# Patient Record
Sex: FEMALE | Race: WHITE | NOT HISPANIC OR LATINO | Employment: OTHER | URBAN - METROPOLITAN AREA
[De-identification: names, ages, dates, MRNs, and addresses within clinical notes are randomized per-mention and may not be internally consistent; named-entity substitution may affect disease eponyms.]

---

## 2018-02-06 ENCOUNTER — OFFICE VISIT CONVERTED (OUTPATIENT)
Dept: ORTHOPEDIC SURGERY | Facility: CLINIC | Age: 68
End: 2018-02-06
Attending: PHYSICIAN ASSISTANT

## 2018-02-14 ENCOUNTER — OFFICE VISIT CONVERTED (OUTPATIENT)
Dept: ORTHOPEDIC SURGERY | Facility: CLINIC | Age: 68
End: 2018-02-14
Attending: ORTHOPAEDIC SURGERY

## 2018-02-20 ENCOUNTER — OFFICE VISIT CONVERTED (OUTPATIENT)
Dept: ORTHOPEDIC SURGERY | Facility: CLINIC | Age: 68
End: 2018-02-20
Attending: ORTHOPAEDIC SURGERY

## 2018-03-08 ENCOUNTER — OFFICE VISIT CONVERTED (OUTPATIENT)
Dept: ORTHOPEDIC SURGERY | Facility: CLINIC | Age: 68
End: 2018-03-08
Attending: ORTHOPAEDIC SURGERY

## 2018-03-21 ENCOUNTER — OFFICE VISIT CONVERTED (OUTPATIENT)
Dept: ORTHOPEDIC SURGERY | Facility: CLINIC | Age: 68
End: 2018-03-21
Attending: PHYSICIAN ASSISTANT

## 2018-04-19 ENCOUNTER — OFFICE VISIT CONVERTED (OUTPATIENT)
Dept: ORTHOPEDIC SURGERY | Facility: CLINIC | Age: 68
End: 2018-04-19
Attending: PHYSICIAN ASSISTANT

## 2018-04-19 ENCOUNTER — CONVERSION ENCOUNTER (OUTPATIENT)
Dept: ORTHOPEDIC SURGERY | Facility: CLINIC | Age: 68
End: 2018-04-19

## 2018-05-31 ENCOUNTER — OFFICE VISIT CONVERTED (OUTPATIENT)
Dept: ORTHOPEDIC SURGERY | Facility: CLINIC | Age: 68
End: 2018-05-31
Attending: PHYSICIAN ASSISTANT

## 2019-09-13 ENCOUNTER — OFFICE VISIT CONVERTED (OUTPATIENT)
Dept: SURGERY | Facility: CLINIC | Age: 69
End: 2019-09-13
Attending: UROLOGY

## 2019-09-13 ENCOUNTER — HOSPITAL ENCOUNTER (OUTPATIENT)
Dept: SURGERY | Facility: CLINIC | Age: 69
Discharge: HOME | End: 2019-09-13

## 2019-09-15 LAB — BACTERIA UR CULT: NORMAL

## 2019-09-24 ENCOUNTER — HOSPITAL ENCOUNTER (OUTPATIENT)
Dept: ULTRASOUND IMAGING | Facility: HOSPITAL | Age: 69
Discharge: HOME | End: 2019-09-24

## 2019-09-27 ENCOUNTER — OFFICE VISIT CONVERTED (OUTPATIENT)
Dept: SURGERY | Facility: CLINIC | Age: 69
End: 2019-09-27
Attending: UROLOGY

## 2020-10-09 ENCOUNTER — HOSPITAL ENCOUNTER (OUTPATIENT)
Dept: MRI IMAGING | Facility: HOSPITAL | Age: 70
Discharge: HOME | End: 2020-10-09
Attending: NURSE PRACTITIONER

## 2020-10-23 ENCOUNTER — OFFICE VISIT CONVERTED (OUTPATIENT)
Dept: ORTHOPEDIC SURGERY | Facility: CLINIC | Age: 70
End: 2020-10-23
Attending: ORTHOPAEDIC SURGERY

## 2021-03-22 ENCOUNTER — OFFICE VISIT CONVERTED (OUTPATIENT)
Dept: ORTHOPEDIC SURGERY | Facility: CLINIC | Age: 71
End: 2021-03-22
Attending: ORTHOPAEDIC SURGERY

## 2021-05-10 NOTE — H&P
History and Physical      Patient Name: Carlene Mims   Patient ID: [id number]   Sex: Female   YOB: 1950    Primary Care Provider: Kaycee RAHMAN   Referring Provider: Kaycee RAHMAN    Visit Date: October 23, 2020    Provider: Marv Collado MD   Location: [facility]   Location Address: [address] [place] [address]  [id number]   Location Phone: [phone]          Chief Complaint  · Right Shoulder Pain      History Of Present Illness  Carlene Mims is a 70 year old /White female who presents for evaluation of her right shoulder. She began having pain in her shoulder about 5 or 6 months ago. She has a history of a mastectomy on this side. She was doing a lot of work about 5 to 6 months ago, cutting hedges and lifting a lot of stuff, and developed shoulder pain. She denies any specific trauma besides overuse, and since then, she has been having pain in her right shoulder.       Past Medical History  Allergic rhinitis; Anxiety; Cancer; Closed 3-part fracture of surgical neck of left humerus with routine healing, subsequent encounter; Depression; Fibromyalgia; Gastric reflux; Gastric Ulcer; High blood pressure; Humerus fracture         Past Surgical History  Cholecystectomy; Colonoscopy; endoscopy; Hip Surgery; Hysterectomy; Mastectomy, left breast; Mastectomy, right breast; Nissen Fundoplication; Cindy Cath Removal; Shoulder surgery; Tonsillectomy         Medication List  Allergy Syringe 1 mL 28 Miscellaneous Syringe; amlodipine 5 mg oral tablet; aspirin 81 mg Oral tablet, chewable; citalopram 20 mg oral tablet; cyclobenzaprine 5 mg oral tablet; Flonase 50 mcg/actuation Nasal Spray, Suspension; hydroxyzine HCl 50 mg oral tablet; lisinopril 40 mg oral tablet; Macrobid 100 mg oral capsule; pantoprazole 40 mg oral tablet,delayed release (DR/EC); Probiotic 15 billion cell oral capsule; Vitamin D3 1,000 unit oral capsule         Allergy List  Bactrim         Family  "Medical History  - No Family History of Colorectal Cancer         Social History  Alcohol (Never); Tobacco (Former)         Review of Systems  · Constitutional  o Denies  o : fever, chills, weight loss  · Cardiovascular  o Denies  o : chest pain, shortness of breath  · Gastrointestinal  o Denies  o : liver disease, heartburn, nausea, blood in stools  · Genitourinary  o Denies  o : painful urination, blood in urine  · Integument  o Denies  o : rash, itching  · Neurologic  o Denies  o : headache, weakness, loss of consciousness  · Musculoskeletal  o Admits  o : painful, swollen joints  · Psychiatric  o Denies  o : drug/alcohol addiction, anxiety, depression      Vitals  Date Time BP Position Site L\R Cuff Size HR RR TEMP (F) WT  HT  BMI kg/m2 BSA m2 O2 Sat FR L/min FiO2 HC       10/23/2020 02:47 PM      88 - R   166lbs 2oz 5'  4\" 28.51 1.84 96 %            Physical Examination  · Constitutional  o Appearance  o : well developed, well-nourished, no obvious deformities present  · Head and Face  o Head  o :   § Inspection  § : normocephalic  o Face  o :   § Inspection  § : no facial lesions  · Eyes  o Conjunctivae  o : conjunctivae normal  o Sclerae  o : sclerae white  · Ears, Nose, Mouth and Throat  o Ears  o :   § External Ears  § : appearance within normal limits  § Hearing  § : intact  o Nose  o :   § External Nose  § : appearance normal  · Neck  o Inspection/Palpation  o : normal appearance  o Range of Motion  o : full range of motion  · Respiratory  o Respiratory Effort  o : breathing unlabored  o Inspection of Chest  o : normal appearance  o Auscultation of Lungs  o : no audible wheezing or rales  · Cardiovascular  o Heart  o : regular rate  · Gastrointestinal  o Abdominal Examination  o : soft and non-tender  · Skin and Subcutaneous Tissue  o General Inspection  o : intact, no rashes  · Psychiatric  o General  o : Alert and oriented x3  o Judgement and Insight  o : judgment and insight intact  o Mood and " Affect  o : mood normal, affect appropriate  · Right Shoulder  o Inspection  o : Appearance of the right shoulder is normal compared to the left. No atrophy or skin discoloration. Full range of motion. Tenderness over the anterior distal supraspinatus rotator cuff insertion. Pain with impingement testing. Pain with Mendiola' testing. Good strength of deltoid, biceps, triceps, wrist extensors, and wrist flexors. Sensation is grossly intact. Neurovascularly intact.   · Injection Note/Aspiration Note  o Site  o : right shoulder  o Procedure  o : After educating the patient, patient gave consent for procedure. After using Chloraprep, the joint space was injected. The patient tolerated the procedure well.  o Medication  o : 80 mg of DepoMedrol with 9cc of 1% Lidocaine  · Imaging  o Imaging  o : MRI of right shoulder performed at [facility] on 10/09/2020 revealed: 1) Tendinosis with partial articular-sided tearing of the supraspinatus and infraspinatus tendons; 2) Moderate AC joint and glenohumeral osteoarthritis; 3) Biceps tendinosis and tenosynovitis; 4) Mild panlabral tearing with no evidence of focal displaced tear; 5) Trace glenohumeral joint effusion.               Assessment  · Right shoulder pain, unspecified chronicity     719.41/M25.511  · Impingement syndrome of shoulder, right     726.2/M75.40      Plan  · Orders  o Depo-Medrol injection 80mg () - - 10/23/2020   Lot [id number] Exp 09 2021 [employer] Administered by EVANS Collado MD  o Shoulder Intra-articular Injection without US Guidance [facility] ([id number]) - - 10/23/2020   Lot [id number] Exp 08 01 2021 [employer] Administered by EVANS Collado MD  · Medications  o Medications have been Reconciled  o Transition of Care or Provider Policy  · Instructions  o Reviewed the patient's Past Medical, Social, and Family history as well as the ROS at today's visit, no changes.  o Call or return if worsening symptoms.  o We discussed right shoulder impingement and injection.    o Follow up in 4-6 weeks.   o This note was transcribed by Karrie cheatham.            Electronically Signed by: Karrie Rosario-, Other -Author on October 26, 2020 08:09:31 PM  Electronically Co-signed by: Marv Collado MD -Reviewer on October 26, 2020 08:55:31 PM

## 2021-05-14 VITALS — HEART RATE: 88 BPM | OXYGEN SATURATION: 96 % | WEIGHT: 166.12 LBS | BODY MASS INDEX: 28.36 KG/M2 | HEIGHT: 64 IN

## 2021-05-14 VITALS — WEIGHT: 165.25 LBS | BODY MASS INDEX: 28.21 KG/M2 | OXYGEN SATURATION: 96 % | HEART RATE: 78 BPM | HEIGHT: 64 IN

## 2021-05-14 NOTE — PROGRESS NOTES
Progress Note      Patient Name: Carlene Mims   Patient ID: [id number]   Sex: Female   YOB: 1950    Primary Care Provider: Kaycee RAHMAN   Referring Provider: Kaycee RAHMAN    Visit Date: March 22, 2021    Provider: Marv Collado MD   Location: [facility]   Location Address: [address] [place] [address]  [id number]   Location Phone: [phone]          Chief Complaint  · Right shoulder pain       History Of Present Illness  Carlene Mims is a 70 year old /White female who presents today to [facility].      Patient presents today for a follow-up of right shoulder pain. Patient has a history of right shoulder impingement that she has been treating conservatively. Patient has been having right shoulder pain due to overuse when cutting hedges and doing a lot of lifting. She states her range of motion has improved and sometimes she has pains that make it feel like her right shoulder is popping. She presents today requesting a right shoulder injection.       Past Medical History  Allergic rhinitis; Anxiety; Cancer; Closed 3-part fracture of surgical neck of left humerus with routine healing, subsequent encounter; Depression; Fibromyalgia; Gastric reflux; Gastric Ulcer; High blood pressure; Humerus fracture         Past Surgical History  Cholecystectomy; Colonoscopy; endoscopy; Hip Surgery; Hysterectomy; Mastectomy, left breast; Mastectomy, right breast; Nissen Fundoplication; Cindy Cath Removal; Shoulder surgery; Tonsillectomy         Medication List  Allergy Syringe 1 mL 28 Miscellaneous Syringe; amlodipine 5 mg oral tablet; aspirin 81 mg Oral tablet, chewable; citalopram 20 mg oral tablet; cyclobenzaprine 5 mg oral tablet; Flonase 50 mcg/actuation Nasal Spray, Suspension; hydroxyzine HCl 50 mg oral tablet; lisinopril 40 mg oral tablet; Macrobid 100 mg oral capsule; pantoprazole 40 mg oral tablet,delayed release (DR/EC); Probiotic 15 billion cell  "oral capsule; Vitamin D3 1,000 unit oral capsule         Allergy List  Bactrim       Allergies Reconciled  Family Medical History  - No Family History of Colorectal Cancer         Social History  Alcohol (Never); Tobacco (Former)         Review of Systems  · Constitutional  o Denies  o : fever, chills, weight loss  · Cardiovascular  o Denies  o : chest pain, shortness of breath  · Gastrointestinal  o Denies  o : liver disease, heartburn, nausea, blood in stools  · Genitourinary  o Denies  o : painful urination, blood in urine  · Integument  o Denies  o : rash, itching  · Neurologic  o Denies  o : headache, weakness, loss of consciousness  · Musculoskeletal  o Denies  o : painful, swollen joints  · Psychiatric  o Denies  o : drug/alcohol addiction, anxiety, depression      Vitals  Date Time BP Position Site L\R Cuff Size HR RR TEMP (F) WT  HT  BMI kg/m2 BSA m2 O2 Sat FR L/min FiO2        03/22/2021 01:45 PM      78 - R   165lbs 4oz 5'  4\" 28.36 1.84 96 %            Physical Examination  · Constitutional  o Appearance  o : well developed, well-nourished, no obvious deformities present  · Head and Face  o Head  o :   § Inspection  § : normocephalic  o Face  o :   § Inspection  § : no facial lesions  · Eyes  o Conjunctivae  o : conjunctivae normal  o Sclerae  o : sclerae white  · Ears, Nose, Mouth and Throat  o Ears  o :   § External Ears  § : appearance within normal limits  § Hearing  § : intact  o Nose  o :   § External Nose  § : appearance normal  · Neck  o Inspection/Palpation  o : normal appearance  o Range of Motion  o : full range of motion  · Respiratory  o Respiratory Effort  o : breathing unlabored  o Inspection of Chest  o : normal appearance  o Auscultation of Lungs  o : no audible wheezing or rales  · Cardiovascular  o Heart  o : regular rate  · Gastrointestinal  o Abdominal Examination  o : soft and non-tender  · Skin and Subcutaneous Tissue  o General Inspection  o : intact, no " rashes  · Psychiatric  o General  o : Alert and oriented x3  o Judgement and Insight  o : judgment and insight intact  o Mood and Affect  o : mood normal, affect appropriate  · Right Shoulder  o Inspection  o : Sensation grossly intact. Neurovascular intact. Skin intact. Pulses are pleasant. Full forward flexion. Full cross body adduction. IR to L5. No swelling, skin discoloration or atrophy. Tender to palpation of the shoulder. Good tone of deltoid, biceps, triceps, wrist extensors, and wrist flexors. Full ER. Full abduction. Pain with Mendiola. Pain with Neer's. Good RTC strength.   · Injection Note/Aspiration Note  o Site  o : right shoulder  o Procedure  o : Procedure: After educating the patient, patient gave consent for procedure. After using Chloraprep, the joint space was injected. The patient tolerated the procedure well.   o Medication  o : 80 mg of DepoMedrol with 9cc of 1% Xylocaine          Assessment  · Right shoulder pain, unspecified chronicity     719.41/M25.511  · Shoulder impingement syndrome, right     726.2/M75.41      Plan  · Orders  o Depo-Medrol injection 80mg () - - 03/23/2021   Lot [id number] Exp 01 2022 [employer] Administered by EVANS Collado MD  o Shoulder Intra-articular Injection without US Guidance [facility] ([id number]) - - 03/23/2021   Lot [id number] Exp 04 2024 [employer] Administered by EVANS Collado MD  · Medications  o Medications have been Reconciled  o Transition of Care or Provider Policy  · Instructions  o Dr. Collado saw and examined the patient and agrees with plan.   o Reviewed the patient's Past Medical, Social, and Family history as well as the ROS at today's visit, no changes.  o Call or return if worsening symptoms.  o Follow Up PRN.  o The above service was scribed by Shey Ardon on my behalf and I attest to the accuracy of the note. linden  o Discussed treatment plans with the patient. Discussed operative vs non-operative measures. She wishes to continue conservative management at  this time. Patient received a right shoulder injection and tolerated this procedure well.            Electronically Signed by: Shey Ardon-, Other -Author on March 25, 2021 07:57:48 AM  Electronically Co-signed by: Marv Collado MD -Reviewer on March 28, 2021 01:44:54 PM

## 2021-05-15 VITALS — BODY MASS INDEX: 26.8 KG/M2 | HEIGHT: 64 IN | WEIGHT: 157 LBS | RESPIRATION RATE: 12 BRPM

## 2021-05-15 VITALS — BODY MASS INDEX: 26.82 KG/M2 | HEIGHT: 64 IN | RESPIRATION RATE: 12 BRPM | WEIGHT: 157.12 LBS

## 2021-05-16 VITALS — HEIGHT: 64 IN | WEIGHT: 159 LBS | BODY MASS INDEX: 27.14 KG/M2 | RESPIRATION RATE: 16 BRPM

## 2021-05-16 VITALS — HEIGHT: 64 IN | RESPIRATION RATE: 16 BRPM | BODY MASS INDEX: 27.14 KG/M2 | WEIGHT: 159 LBS

## 2021-05-16 VITALS
RESPIRATION RATE: 16 BRPM | RESPIRATION RATE: 16 BRPM | BODY MASS INDEX: 26.98 KG/M2 | BODY MASS INDEX: 27.14 KG/M2 | HEIGHT: 64 IN | WEIGHT: 159 LBS | WEIGHT: 158 LBS | HEIGHT: 64 IN

## 2021-05-16 VITALS — HEIGHT: 64 IN | RESPIRATION RATE: 18 BRPM | BODY MASS INDEX: 27.14 KG/M2 | WEIGHT: 159 LBS

## 2021-11-04 ENCOUNTER — OFFICE VISIT (OUTPATIENT)
Dept: GASTROENTEROLOGY | Facility: CLINIC | Age: 71
End: 2021-11-04

## 2021-11-04 ENCOUNTER — LAB (OUTPATIENT)
Dept: LAB | Facility: HOSPITAL | Age: 71
End: 2021-11-04

## 2021-11-04 VITALS
DIASTOLIC BLOOD PRESSURE: 59 MMHG | SYSTOLIC BLOOD PRESSURE: 145 MMHG | BODY MASS INDEX: 27.17 KG/M2 | WEIGHT: 159.17 LBS | HEIGHT: 64 IN | HEART RATE: 86 BPM

## 2021-11-04 DIAGNOSIS — R10.10 UPPER ABDOMINAL PAIN: ICD-10-CM

## 2021-11-04 DIAGNOSIS — R14.0 ABDOMINAL BLOATING: Primary | ICD-10-CM

## 2021-11-04 DIAGNOSIS — R63.0 DECREASED APPETITE: ICD-10-CM

## 2021-11-04 DIAGNOSIS — R14.0 ABDOMINAL BLOATING: ICD-10-CM

## 2021-11-04 DIAGNOSIS — R63.4 WEIGHT LOSS: ICD-10-CM

## 2021-11-04 DIAGNOSIS — Z87.19 HISTORY OF DIVERTICULITIS: ICD-10-CM

## 2021-11-04 LAB
ALBUMIN SERPL-MCNC: 4.7 G/DL (ref 3.5–5.2)
ALBUMIN/GLOB SERPL: 1.6 G/DL
ALP SERPL-CCNC: 156 U/L (ref 39–117)
ALT SERPL W P-5'-P-CCNC: 16 U/L (ref 1–33)
ANION GAP SERPL CALCULATED.3IONS-SCNC: 8.7 MMOL/L (ref 5–15)
AST SERPL-CCNC: 21 U/L (ref 1–32)
BILIRUB SERPL-MCNC: 0.2 MG/DL (ref 0–1.2)
BUN SERPL-MCNC: 21 MG/DL (ref 8–23)
BUN/CREAT SERPL: 15.6 (ref 7–25)
CALCIUM SPEC-SCNC: 10.5 MG/DL (ref 8.6–10.5)
CHLORIDE SERPL-SCNC: 103 MMOL/L (ref 98–107)
CO2 SERPL-SCNC: 26.3 MMOL/L (ref 22–29)
CREAT SERPL-MCNC: 1.35 MG/DL (ref 0.57–1)
GFR SERPL CREATININE-BSD FRML MDRD: 39 ML/MIN/1.73
GLOBULIN UR ELPH-MCNC: 2.9 GM/DL
GLUCOSE SERPL-MCNC: 94 MG/DL (ref 65–99)
LIPASE SERPL-CCNC: 31 U/L (ref 13–60)
POTASSIUM SERPL-SCNC: 4.4 MMOL/L (ref 3.5–5.2)
PROT SERPL-MCNC: 7.6 G/DL (ref 6–8.5)
SODIUM SERPL-SCNC: 138 MMOL/L (ref 136–145)

## 2021-11-04 PROCEDURE — 36415 COLL VENOUS BLD VENIPUNCTURE: CPT

## 2021-11-04 PROCEDURE — 99204 OFFICE O/P NEW MOD 45 MIN: CPT | Performed by: NURSE PRACTITIONER

## 2021-11-04 PROCEDURE — 80053 COMPREHEN METABOLIC PANEL: CPT

## 2021-11-04 PROCEDURE — 83690 ASSAY OF LIPASE: CPT | Performed by: NURSE PRACTITIONER

## 2021-11-04 PROCEDURE — 86677 HELICOBACTER PYLORI ANTIBODY: CPT

## 2021-11-04 RX ORDER — PSEUDOEPHEDRINE HCL 30 MG/1
TABLET, FILM COATED ORAL
COMMUNITY
Start: 2021-10-25

## 2021-11-04 RX ORDER — FLUTICASONE PROPIONATE 50 MCG
SPRAY, SUSPENSION (ML) NASAL
COMMUNITY

## 2021-11-04 RX ORDER — TRAZODONE HYDROCHLORIDE 50 MG/1
50 TABLET ORAL
COMMUNITY
Start: 2021-10-25

## 2021-11-04 RX ORDER — ASPIRIN 81 MG/1
TABLET, CHEWABLE ORAL
COMMUNITY
End: 2022-03-09

## 2021-11-04 RX ORDER — LISINOPRIL 40 MG/1
TABLET ORAL
COMMUNITY

## 2021-11-04 RX ORDER — ESOMEPRAZOLE MAGNESIUM 40 MG/1
CAPSULE, DELAYED RELEASE ORAL
COMMUNITY

## 2021-11-04 RX ORDER — SOD SULF/POT CHLORIDE/MAG SULF 1.479 G
12 TABLET ORAL TAKE AS DIRECTED
Qty: 24 TABLET | Refills: 0 | Status: SHIPPED | OUTPATIENT
Start: 2021-11-04 | End: 2021-12-22

## 2021-11-04 RX ORDER — CITALOPRAM 20 MG/1
TABLET ORAL
COMMUNITY

## 2021-11-04 RX ORDER — CYCLOBENZAPRINE HCL 5 MG
5 TABLET ORAL 2 TIMES DAILY PRN
COMMUNITY
Start: 2021-07-30

## 2021-11-04 RX ORDER — AMLODIPINE BESYLATE 5 MG/1
TABLET ORAL
COMMUNITY

## 2021-11-04 RX ORDER — HYDROXYZINE HYDROCHLORIDE 25 MG/1
25 TABLET, FILM COATED ORAL 3 TIMES DAILY PRN
COMMUNITY
Start: 2021-09-28 | End: 2022-03-09

## 2021-11-04 RX ORDER — SUCRALFATE 1 G/1
TABLET ORAL
COMMUNITY
End: 2022-03-09

## 2021-11-04 RX ORDER — FAMOTIDINE 40 MG/1
40 TABLET, FILM COATED ORAL 2 TIMES DAILY
COMMUNITY
Start: 2021-09-21

## 2021-11-04 RX ORDER — PROMETHAZINE HYDROCHLORIDE 12.5 MG/1
12.5 TABLET ORAL EVERY 6 HOURS PRN
Qty: 15 TABLET | Refills: 0 | Status: SHIPPED | OUTPATIENT
Start: 2021-11-04 | End: 2021-12-22

## 2021-11-04 NOTE — PROGRESS NOTES
"Patient Name: Carlene Mims   Visit Date: 11/04/2021   Patient ID: [id number]  Provider: JOSIAH Lance    Sex: female  Location:  Location Address:  Location Phone: [address] [address] [phone]    YOB: 1950      Primary Care Provider Kaycee Evans APRN      Referring Provider: No ref. provider found        Chief Complaint  Abdominal Pain (Upper abdomen pain, Burning sensation and sometimes feels like spasms) and Nausea    History of Present Illness  Carlene Mims is a 71 y.o. who presents to [facility] on referral from No ref. provider found for a gastroenterology evaluation of Abdominal Pain (Upper abdomen pain, Burning sensation and sometimes feels like spasms) and Nausea.    Ms. Mims reports upper abdominal burning and \"spasaming\" for the last few months. PCP placed her on protonix and carafate with no relief of symtoms. She is now on nexium, pepcid, and carafate and symptoms resolved for several days however is now back \"and worse than ever\". Abdominal bloating present. Reflux when laying down at night. Several nights she has awoken due to the reflux and had to go lay on the couch for elevation of head.Nausea often without vomiting.  Appetite has significantly decreased, 10 pound weight loss in the last few months.    H/o Nissen and hiatal hernia repair 2015    Reports she also was diagnosed with diverticulitis approximately 5 months ago [facility], requesting records.  Unable to recall when her last colonoscopy was \"long time ago\". Bowel movement normally several times a week, however has been a week before without BM. Using miralax PRN which does help facilitate a BM.  Denies any hematochezia, melena, or family hx of colon cancer.     Labs Result Review Imaging    Past Medical History:   Diagnosis Date   • Hypertension        Past Surgical History:   Procedure Laterality Date   • CHOLECYSTECTOMY  " 1999   • HERNIA REPAIR  2015   • HYSTERECTOMY  1975   • MASTECTOMY  1995, 2011         Current Outpatient Medications:   •  amLODIPine (NORVASC) 5 MG tablet, amlodipine 5 mg oral tablet take 1 tablet (5 mg) by oral route once daily   Active, Disp: , Rfl:   •  aspirin 81 MG chewable tablet, aspirin 81 mg oral tablet,chewable chew 1 tablet (81 mg) by oral route once daily   Active, Disp: , Rfl:   •  Cholecalciferol 25 MCG (1000 UT) capsule, Vitamin D3 1,000 unit oral capsule take 1 capsule by oral route daily   Active, Disp: , Rfl:   •  citalopram (CeleXA) 20 MG tablet, citalopram 20 mg oral tablet take 1 tablet (20 mg) by oral route once daily   Active, Disp: , Rfl:   •  cyclobenzaprine (FLEXERIL) 5 MG tablet, Take 5 mg by mouth 2 (Two) Times a Day., Disp: , Rfl:   •  esomeprazole (nexIUM) 40 MG capsule, Nexium 40 mg oral capsule,delayed release(DR/EC) take 1 capsule (40 mg) by oral route once daily   Suspended, Disp: , Rfl:   •  famotidine (PEPCID) 40 MG tablet, Take 40 mg by mouth 2 (Two) Times a Day., Disp: , Rfl:   •  fluticasone (Flonase) 50 MCG/ACT nasal spray, Flonase 50 mcg/actuation nasal spray,suspension inhale 1 spray (50 mcg) in each nostril by intranasal route once daily   Active, Disp: , Rfl:   •  hydrOXYzine (ATARAX) 25 MG tablet, Take 25 mg by mouth 3 (Three) Times a Day As Needed., Disp: , Rfl:   •  lisinopril (PRINIVIL,ZESTRIL) 40 MG tablet, lisinopril 40 mg oral tablet take 1 tablet (40 mg) by oral route once daily   Active, Disp: , Rfl:   •  sucralfate (Carafate) 1 g tablet, Carafate 1 gram oral tablet take 1 tablet (1 gram) by oral route 4 times per day on an empty stomach 1 hour before meals and at bedtime   Suspended, Disp: , Rfl:   •  SudoGest 30 MG tablet, TAKE ONE TABLET BY MOUTH EVERY 4 TO 6 HOURS FOR sinus pressure (monitor blood pressure), Disp: , Rfl:   •  traZODone (DESYREL) 50 MG tablet, Take 50 mg by mouth every night at bedtime., Disp: , Rfl:   •  promethazine (PHENERGAN) 12.5 MG  "tablet, Take 1 tablet by mouth Every 6 (Six) Hours As Needed for Nausea or Vomiting., Disp: 15 tablet, Rfl: 0  •  Sodium Sulfate-Mag Sulfate-KCl (Sutab) 8255-294-216 MG tablet, Take 12 tablets by mouth Take As Directed. Take 12 tablets at 6 pm and 12 tablets 4 hours prior to procedure., Disp: 24 tablet, Rfl: 0     Allergies   Allergen Reactions   • Sulfamethoxazole-Trimethoprim Unknown - Low Severity       History reviewed. No pertinent family history.     Social History     Social History Narrative   • Not on file         Objective     Review of Systems   Constitutional: Positive for appetite change and unexpected weight loss.   Gastrointestinal: Positive for abdominal pain and nausea.        Vital Signs:   /59 (BP Location: Right leg, Patient Position: Sitting, Cuff Size: Large Adult)   Pulse 86   Ht 162.6 cm (64\")   Wt 72.2 kg (159 lb 2.8 oz)   BMI 27.32 kg/m²       Physical Exam  Abdominal:      Tenderness: There is abdominal tenderness in the right upper quadrant and epigastric area.         Result Review :   The following data was reviewed by: JOSIAH Lance on 11/04/2021:      No results found for: IRON, TIBC, FERRITIN, LABIRON           Assessment and Plan    Diagnoses and all orders for this visit:    1. Abdominal bloating (Primary)  -     Helicobacter Pylori, IgA IgG IgM; Future  -     Comprehensive Metabolic Panel; Future  -     Lipase  -     Case Request; Standing  -     COVID PRE-OP / PRE-PROCEDURE SCREENING ORDER (NO ISOLATION) - Swab, Nasopharynx; Future  -     Case Request    2. Upper abdominal pain  -     Helicobacter Pylori, IgA IgG IgM; Future  -     Comprehensive Metabolic Panel; Future  -     Lipase  -     Case Request; Standing  -     COVID PRE-OP / PRE-PROCEDURE SCREENING ORDER (NO ISOLATION) - Swab, Nasopharynx; Future  -     Case Request    3. History of diverticulitis  -     Case Request; Standing  -     COVID PRE-OP / PRE-PROCEDURE SCREENING ORDER (NO ISOLATION) - Swab, " Nasopharynx; Future  -     Case Request    4. Decreased appetite    5. Weight loss    Other orders  -     Follow Anesthesia Guidelines / Protocol; Future  -     Obtain Informed Consent; Future  -     Sodium Sulfate-Mag Sulfate-KCl (Sutab) 0339-062-326 MG tablet; Take 12 tablets by mouth Take As Directed. Take 12 tablets at 6 pm and 12 tablets 4 hours prior to procedure.  Dispense: 24 tablet; Refill: 0  -     promethazine (PHENERGAN) 12.5 MG tablet; Take 1 tablet by mouth Every 6 (Six) Hours As Needed for Nausea or Vomiting.  Dispense: 15 tablet; Refill: 0      ESOPHAGOGASTRODUODENOSCOPY (N/A), COLONOSCOPY (N/A)       Follow Up   Return for Follow up after procedure.  Patient was given instructions and counseling regarding her condition or for health maintenance advice. Please see specific information pulled into the AVS if appropriate.

## 2021-11-06 LAB
H PYLORI IGA SER-ACNC: <9 UNITS (ref 0–8.9)
H PYLORI IGG SER IA-ACNC: 0.84 INDEX VALUE (ref 0–0.79)
H PYLORI IGM SER-ACNC: <9 UNITS (ref 0–8.9)

## 2021-11-08 DIAGNOSIS — R74.8 ELEVATED ALKALINE PHOSPHATASE LEVEL: Primary | ICD-10-CM

## 2021-11-09 ENCOUNTER — TELEPHONE (OUTPATIENT)
Dept: GASTROENTEROLOGY | Facility: CLINIC | Age: 71
End: 2021-11-09

## 2021-11-09 NOTE — TELEPHONE ENCOUNTER
----- Message from JOSIAH Lance sent at 11/8/2021  4:13 PM EST -----  Elevated alkaline phosphatase.  Ordering abdominal ultrasound to further evaluate.

## 2021-11-11 NOTE — TELEPHONE ENCOUNTER
Patient called back about her results. Patient was given results and consented to doing the US. US was scheduled for patient. Patient verbalized understanding. Patient was notified that she had to be NPO 8hrs prior to the test.

## 2021-11-17 ENCOUNTER — HOSPITAL ENCOUNTER (OUTPATIENT)
Dept: ULTRASOUND IMAGING | Facility: HOSPITAL | Age: 71
Discharge: HOME | End: 2021-11-17
Admitting: NURSE PRACTITIONER

## 2021-11-17 DIAGNOSIS — R74.8 ELEVATED ALKALINE PHOSPHATASE LEVEL: ICD-10-CM

## 2021-11-17 PROCEDURE — 76700 US EXAM ABDOM COMPLETE: CPT

## 2021-11-18 DIAGNOSIS — J90 PLEURAL EFFUSION: Primary | ICD-10-CM

## 2021-11-19 ENCOUNTER — TELEPHONE (OUTPATIENT)
Dept: GASTROENTEROLOGY | Facility: CLINIC | Age: 71
End: 2021-11-19

## 2021-11-19 NOTE — TELEPHONE ENCOUNTER
Patient was given results and lab order for x ray was sent to [facility] per patient request.

## 2021-11-19 NOTE — TELEPHONE ENCOUNTER
----- Message from JOSIAH Lance sent at 11/18/2021  1:43 PM EST -----  Suspected small left pleural effusion noted on CT.  Ordering chest x-ray to further evaluate.

## 2021-11-21 ENCOUNTER — HOSPITAL ENCOUNTER (EMERGENCY)
Facility: HOSPITAL | Age: 71
Discharge: HOME | End: 2021-11-21
Attending: EMERGENCY MEDICINE | Admitting: EMERGENCY MEDICINE

## 2021-11-21 ENCOUNTER — APPOINTMENT (OUTPATIENT)
Dept: CT IMAGING | Facility: HOSPITAL | Age: 71
End: 2021-11-21

## 2021-11-21 VITALS
TEMPERATURE: 98.2 F | BODY MASS INDEX: 26.12 KG/M2 | WEIGHT: 153 LBS | RESPIRATION RATE: 18 BRPM | SYSTOLIC BLOOD PRESSURE: 146 MMHG | HEART RATE: 79 BPM | DIASTOLIC BLOOD PRESSURE: 74 MMHG | OXYGEN SATURATION: 96 % | HEIGHT: 64 IN

## 2021-11-21 DIAGNOSIS — R10.13 ABDOMINAL PAIN, ACUTE, EPIGASTRIC: Primary | ICD-10-CM

## 2021-11-21 LAB
ALBUMIN SERPL-MCNC: 4.5 G/DL (ref 3.5–5.2)
ALBUMIN/GLOB SERPL: 1.6 G/DL
ALP SERPL-CCNC: 174 U/L (ref 39–117)
ALT SERPL W P-5'-P-CCNC: 10 U/L (ref 1–33)
ANION GAP SERPL CALCULATED.3IONS-SCNC: 11.3 MMOL/L (ref 5–15)
AST SERPL-CCNC: 15 U/L (ref 1–32)
BACTERIA UR QL AUTO: ABNORMAL /HPF
BASOPHILS # BLD AUTO: 0.06 10*3/MM3 (ref 0–0.2)
BASOPHILS NFR BLD AUTO: 0.8 % (ref 0–1.5)
BILIRUB SERPL-MCNC: 0.3 MG/DL (ref 0–1.2)
BILIRUB UR QL STRIP: NEGATIVE
BUN SERPL-MCNC: 15 MG/DL (ref 8–23)
BUN/CREAT SERPL: 13.4 (ref 7–25)
CALCIUM SPEC-SCNC: 10.2 MG/DL (ref 8.6–10.5)
CHLORIDE SERPL-SCNC: 103 MMOL/L (ref 98–107)
CLARITY UR: CLEAR
CO2 SERPL-SCNC: 25.7 MMOL/L (ref 22–29)
COLOR UR: YELLOW
CREAT SERPL-MCNC: 1.12 MG/DL (ref 0.57–1)
DEPRECATED RDW RBC AUTO: 43.4 FL (ref 37–54)
EOSINOPHIL # BLD AUTO: 0.24 10*3/MM3 (ref 0–0.4)
EOSINOPHIL NFR BLD AUTO: 3.1 % (ref 0.3–6.2)
ERYTHROCYTE [DISTWIDTH] IN BLOOD BY AUTOMATED COUNT: 13.1 % (ref 12.3–15.4)
GFR SERPL CREATININE-BSD FRML MDRD: 48 ML/MIN/1.73
GLOBULIN UR ELPH-MCNC: 2.9 GM/DL
GLUCOSE SERPL-MCNC: 101 MG/DL (ref 65–99)
GLUCOSE UR STRIP-MCNC: NEGATIVE MG/DL
HCT VFR BLD AUTO: 38.3 % (ref 34–46.6)
HGB BLD-MCNC: 12.5 G/DL (ref 12–15.9)
HGB UR QL STRIP.AUTO: NEGATIVE
HOLD SPECIMEN: NORMAL
HOLD SPECIMEN: NORMAL
HYALINE CASTS UR QL AUTO: ABNORMAL /LPF
IMM GRANULOCYTES # BLD AUTO: 0.01 10*3/MM3 (ref 0–0.05)
IMM GRANULOCYTES NFR BLD AUTO: 0.1 % (ref 0–0.5)
KETONES UR QL STRIP: NEGATIVE
LEUKOCYTE ESTERASE UR QL STRIP.AUTO: ABNORMAL
LIPASE SERPL-CCNC: 23 U/L (ref 13–60)
LYMPHOCYTES # BLD AUTO: 1.74 10*3/MM3 (ref 0.7–3.1)
LYMPHOCYTES NFR BLD AUTO: 22.4 % (ref 19.6–45.3)
MCH RBC QN AUTO: 29.8 PG (ref 26.6–33)
MCHC RBC AUTO-ENTMCNC: 32.6 G/DL (ref 31.5–35.7)
MCV RBC AUTO: 91.2 FL (ref 79–97)
MONOCYTES # BLD AUTO: 0.6 10*3/MM3 (ref 0.1–0.9)
MONOCYTES NFR BLD AUTO: 7.7 % (ref 5–12)
NEUTROPHILS NFR BLD AUTO: 5.12 10*3/MM3 (ref 1.7–7)
NEUTROPHILS NFR BLD AUTO: 65.9 % (ref 42.7–76)
NITRITE UR QL STRIP: NEGATIVE
NRBC BLD AUTO-RTO: 0 /100 WBC (ref 0–0.2)
PH UR STRIP.AUTO: 7.5 [PH] (ref 5–8)
PLATELET # BLD AUTO: 414 10*3/MM3 (ref 140–450)
PMV BLD AUTO: 10.2 FL (ref 6–12)
POTASSIUM SERPL-SCNC: 4.4 MMOL/L (ref 3.5–5.2)
PROT SERPL-MCNC: 7.4 G/DL (ref 6–8.5)
PROT UR QL STRIP: NEGATIVE
QT INTERVAL: 356 MS
RBC # BLD AUTO: 4.2 10*6/MM3 (ref 3.77–5.28)
RBC # UR STRIP: ABNORMAL /HPF
REF LAB TEST METHOD: ABNORMAL
SODIUM SERPL-SCNC: 140 MMOL/L (ref 136–145)
SP GR UR STRIP: 1.01 (ref 1–1.03)
SQUAMOUS #/AREA URNS HPF: ABNORMAL /HPF
TROPONIN T SERPL-MCNC: <0.01 NG/ML (ref 0–0.03)
UROBILINOGEN UR QL STRIP: ABNORMAL
WBC # UR STRIP: ABNORMAL /HPF
WBC NRBC COR # BLD: 7.77 10*3/MM3 (ref 3.4–10.8)
WHOLE BLOOD HOLD SPECIMEN: NORMAL
WHOLE BLOOD HOLD SPECIMEN: NORMAL

## 2021-11-21 PROCEDURE — 99283 EMERGENCY DEPT VISIT LOW MDM: CPT

## 2021-11-21 PROCEDURE — 83690 ASSAY OF LIPASE: CPT | Performed by: EMERGENCY MEDICINE

## 2021-11-21 PROCEDURE — 93010 ELECTROCARDIOGRAM REPORT: CPT | Performed by: INTERNAL MEDICINE

## 2021-11-21 PROCEDURE — 25010000002 KETOROLAC TROMETHAMINE PER 15 MG: Performed by: EMERGENCY MEDICINE

## 2021-11-21 PROCEDURE — 80053 COMPREHEN METABOLIC PANEL: CPT | Performed by: EMERGENCY MEDICINE

## 2021-11-21 PROCEDURE — 84484 ASSAY OF TROPONIN QUANT: CPT | Performed by: EMERGENCY MEDICINE

## 2021-11-21 PROCEDURE — 96374 THER/PROPH/DIAG INJ IV PUSH: CPT

## 2021-11-21 PROCEDURE — 93005 ELECTROCARDIOGRAM TRACING: CPT | Performed by: EMERGENCY MEDICINE

## 2021-11-21 PROCEDURE — 36415 COLL VENOUS BLD VENIPUNCTURE: CPT

## 2021-11-21 PROCEDURE — 85025 COMPLETE CBC W/AUTO DIFF WBC: CPT

## 2021-11-21 PROCEDURE — 0 IOPAMIDOL PER 1 ML: Performed by: EMERGENCY MEDICINE

## 2021-11-21 PROCEDURE — 74177 CT ABD & PELVIS W/CONTRAST: CPT

## 2021-11-21 PROCEDURE — 81001 URINALYSIS AUTO W/SCOPE: CPT | Performed by: EMERGENCY MEDICINE

## 2021-11-21 PROCEDURE — 93005 ELECTROCARDIOGRAM TRACING: CPT

## 2021-11-21 RX ORDER — KETOROLAC TROMETHAMINE 15 MG/ML
15 INJECTION, SOLUTION INTRAMUSCULAR; INTRAVENOUS ONCE
Status: COMPLETED | OUTPATIENT
Start: 2021-11-21 | End: 2021-11-21

## 2021-11-21 RX ORDER — SODIUM CHLORIDE 0.9 % (FLUSH) 0.9 %
10 SYRINGE (ML) INJECTION AS NEEDED
Status: DISCONTINUED | OUTPATIENT
Start: 2021-11-21 | End: 2021-11-21 | Stop reason: HOSPADM

## 2021-11-21 RX ORDER — HYDROCODONE BITARTRATE AND ACETAMINOPHEN 5; 325 MG/1; MG/1
1 TABLET ORAL EVERY 6 HOURS PRN
Qty: 20 TABLET | Refills: 0 | Status: SHIPPED | OUTPATIENT
Start: 2021-11-21 | End: 2021-12-22

## 2021-11-21 RX ORDER — CIPROFLOXACIN 500 MG/1
500 TABLET, FILM COATED ORAL 2 TIMES DAILY
COMMUNITY
End: 2021-12-08

## 2021-11-21 RX ADMIN — IOPAMIDOL 100 ML: 755 INJECTION, SOLUTION INTRAVENOUS at 16:49

## 2021-11-21 RX ADMIN — KETOROLAC TROMETHAMINE 15 MG: 15 INJECTION, SOLUTION INTRAMUSCULAR; INTRAVENOUS at 14:18

## 2021-11-21 RX ADMIN — SODIUM CHLORIDE, PRESERVATIVE FREE 10 ML: 5 INJECTION INTRAVENOUS at 14:15

## 2021-11-21 NOTE — DISCHARGE INSTRUCTIONS
Drink plenty of fluids.  [place] diet.  Avoid spicy foods, alcohol, NSAIDs, or any other dietary triggers.  Continue current home medications as prescribed.  Take pain medication sparingly as needed for for pain.  Follow-up with Dr. Warren for outpatient EGD as already scheduled.  Return to the ER for any change or worsening symptoms.

## 2021-11-21 NOTE — ED PROVIDER NOTES
Time: 1:25 PM EST  Arrived by: private car  Chief Complaint: abdominal pain  History provided by: patient  History is limited by: N/A     History of Present Illness:  Patient is a 71 y.o. year old female that presents to the emergency department with ABDOMINAL PAIN which began about 2.5 months ago. Pain worsened last night. Pain is described a burning sensation. Pain is located over her epigastric region and radiates up to her upper abdomen, chest and shoulders. Patient states her pain is worse after eating.     She also note associated nausea but has not vomited.     Patient reports she followed up with her PCP regarding this pain. She states she was told her LFT's were elevated. She states she had an ultrasound that showed some fluid near her lungs. She states she takes Nexium and Carafate with some relief.     Patient's abdominal surgeries include cholecystectomy and hysterectomy.       HPI      Patient Care Team  Primary Care Provider: Kaycee Evans APRN    Past Medical History:     Allergies   Allergen Reactions   • Sulfamethoxazole-Trimethoprim Unknown - Low Severity     Past Medical History:   Diagnosis Date   • GERD (gastroesophageal reflux disease)    • Hypertension    • UTI (urinary tract infection)      Past Surgical History:   Procedure Laterality Date   • CHOLECYSTECTOMY  1999   • HERNIA REPAIR  2015   • HYSTERECTOMY  1975   • MASTECTOMY  1995, 2011     History reviewed. No pertinent family history.    Home Medications:  Prior to Admission medications    Medication Sig Start Date End Date Taking? Authorizing Provider   ciprofloxacin (CIPRO) 500 MG tablet Take 500 mg by mouth 2 (Two) Times a Day.   Yes ProviderGinette MD   amLODIPine (NORVASC) 5 MG tablet amlodipine 5 mg oral tablet take 1 tablet (5 mg) by oral route once daily   Active    ProviderGinette MD   aspirin 81 MG chewable tablet aspirin 81 mg oral tablet,chewable chew 1 tablet (81 mg) by oral route once daily   Active     Ginette Castro MD   Cholecalciferol 25 MCG (1000 UT) capsule Vitamin D3 1,000 unit oral capsule take 1 capsule by oral route daily   Active    Ginette Castro MD   citalopram (CeleXA) 20 MG tablet citalopram 20 mg oral tablet take 1 tablet (20 mg) by oral route once daily   Active    Ginette Castro MD   cyclobenzaprine (FLEXERIL) 5 MG tablet Take 5 mg by mouth 2 (Two) Times a Day. 7/30/21   Ginette Castro MD   esomeprazole (nexIUM) 40 MG capsule Nexium 40 mg oral capsule,delayed release(DR/EC) take 1 capsule (40 mg) by oral route once daily   Suspended    Ginette Castro MD   famotidine (PEPCID) 40 MG tablet Take 40 mg by mouth 2 (Two) Times a Day. 9/21/21   Ginette Castro MD   fluticasone (Flonase) 50 MCG/ACT nasal spray Flonase 50 mcg/actuation nasal spray,suspension inhale 1 spray (50 mcg) in each nostril by intranasal route once daily   Active    Ginette Castro MD   hydrOXYzine (ATARAX) 25 MG tablet Take 25 mg by mouth 3 (Three) Times a Day As Needed. 9/28/21   Ginette Castro MD   lisinopril (PRINIVIL,ZESTRIL) 40 MG tablet lisinopril 40 mg oral tablet take 1 tablet (40 mg) by oral route once daily   Active    Ginette Castro MD   promethazine (PHENERGAN) 12.5 MG tablet Take 1 tablet by mouth Every 6 (Six) Hours As Needed for Nausea or Vomiting. 11/4/21   Vaishali Nieves APRN   Sodium Sulfate-Mag Sulfate-KCl (Sutab) [phone] MG tablet Take 12 tablets by mouth Take As Directed. Take 12 tablets at 6 pm and 12 tablets 4 hours prior to procedure. 11/4/21   Vaishali Nieves APRN   sucralfate (Carafate) 1 g tablet Carafate 1 gram oral tablet take 1 tablet (1 gram) by oral route 4 times per day on an empty stomach 1 hour before meals and at bedtime   Suspended    Ginette Castro MD   SudoGest 30 MG tablet TAKE ONE TABLET BY MOUTH EVERY 4 TO 6 HOURS FOR sinus pressure (monitor blood pressure) 10/25/21   Provider, Historical, MD   traZODone  "(DESYREL) 50 MG tablet Take 50 mg by mouth every night at bedtime. 10/25/21   Provider, Ginette, MD        Social History:   Social History     Tobacco Use   • Smoking status: Former Smoker   • Smokeless tobacco: Never Used   • Tobacco comment: quit 28 years ago   Substance Use Topics   • Alcohol use: Not Currently   • Drug use: Not Currently          Review of Systems:  Review of Systems   Constitutional: Negative for chills and fever.   HENT: Negative for ear discharge.    Eyes: Negative for photophobia.   Respiratory: Negative for cough and shortness of breath.    Cardiovascular: Positive for chest pain.   Gastrointestinal: Positive for abdominal pain and nausea. Negative for diarrhea and vomiting.   Genitourinary: Negative for dysuria.   Musculoskeletal: Negative for back pain and neck pain.        Shoulder pain   Skin: Negative for rash.   Neurological: Negative for headaches.        Physical Exam:  /74   Pulse 79   Temp 98.2 °F (36.8 °C)   Resp 18   Ht 162.6 cm (64\")   Wt 69.4 kg (153 lb)   SpO2 96%   BMI 26.26 kg/m²     Physical Exam  Vitals and nursing note reviewed.   Constitutional:       General: She is not in acute distress.  HENT:      Head: Normocephalic and atraumatic.   Cardiovascular:      Rate and Rhythm: Normal rate and regular rhythm.      Heart sounds: No murmur heard.      Pulmonary:      Effort: No respiratory distress.      Breath sounds: Normal breath sounds.   Abdominal:      Palpations: Abdomen is soft.      Tenderness: There is abdominal tenderness (moderate) in the epigastric area.      Comments: No palpable masses. No palpable hernia.    Musculoskeletal:      Cervical back: Neck supple.   Skin:     General: Skin is warm and dry.      Findings: No rash.   Neurological:      General: No focal deficit present.      Mental Status: She is alert and oriented to person, place, and time.                Medications in the Emergency Department:  Medications   ketorolac (TORADOL) " injection 15 mg (15 mg Intravenous Given 11/21/21 1418)   iopamidol (ISOVUE-370) 76 % injection 100 mL (100 mL Intravenous Given 11/21/21 1649)        Labs  Lab Results (last 24 hours)     Procedure Component Value Units Date/Time    CBC & Differential [[id number]]  (Normal) Collected: 11/21/21 1156    Specimen: Blood Updated: 11/21/21 1227    Narrative:      The following orders were created for panel order CBC & Differential.  Procedure                               Abnormality         Status                     ---------                               -----------         ------                     CBC Auto Differential[[id number]]        Normal              Final result                 Please view results for these tests on the individual orders.    Comprehensive Metabolic Panel [[id number]]  (Abnormal) Collected: 11/21/21 1156    Specimen: Blood Updated: 11/21/21 1246     Glucose 101 mg/dL      BUN 15 mg/dL      Creatinine 1.12 mg/dL      Sodium 140 mmol/L      Potassium 4.4 mmol/L      Chloride 103 mmol/L      CO2 25.7 mmol/L      Calcium 10.2 mg/dL      Total Protein 7.4 g/dL      Albumin 4.50 g/dL      ALT (SGPT) 10 U/L      AST (SGOT) 15 U/L      Alkaline Phosphatase 174 U/L      Total Bilirubin 0.3 mg/dL      eGFR Non African Amer 48 mL/min/1.73      Globulin 2.9 gm/dL      A/G Ratio 1.6 g/dL      BUN/Creatinine Ratio 13.4     Anion Gap 11.3 mmol/L     Narrative:      GFR Normal >60  Chronic Kidney Disease <60  Kidney Failure <15      Lipase [[id number]]  (Normal) Collected: 11/21/21 1156    Specimen: Blood Updated: 11/21/21 1246     Lipase 23 U/L     Troponin [[id number]]  (Normal) Collected: 11/21/21 1156    Specimen: Blood Updated: 11/21/21 1246     Troponin T <0.010 ng/mL     Narrative:      Troponin T Reference Range:  <= 0.03 ng/mL-   Negative for AMI  >0.03 ng/mL-     Abnormal for myocardial necrosis.  Clinicians would have to utilize clinical acumen, EKG, Troponin and serial changes to determine if it is  an Acute Myocardial Infarction or myocardial injury due to an underlying chronic condition.       Results may be falsely decreased if patient taking Biotin.      CBC Auto Differential [[id number]]  (Normal) Collected: 11/21/21 1156    Specimen: Blood Updated: 11/21/21 1227     WBC 7.77 10*3/mm3      RBC 4.20 10*6/mm3      Hemoglobin 12.5 g/dL      Hematocrit 38.3 %      MCV 91.2 fL      MCH 29.8 pg      MCHC 32.6 g/dL      RDW 13.1 %      RDW-SD 43.4 fl      MPV 10.2 fL      Platelets 414 10*3/mm3      Neutrophil % 65.9 %      Lymphocyte % 22.4 %      Monocyte % 7.7 %      Eosinophil % 3.1 %      Basophil % 0.8 %      Immature Grans % 0.1 %      Neutrophils, Absolute 5.12 10*3/mm3      Lymphocytes, Absolute 1.74 10*3/mm3      Monocytes, Absolute 0.60 10*3/mm3      Eosinophils, Absolute 0.24 10*3/mm3      Basophils, Absolute 0.06 10*3/mm3      Immature Grans, Absolute 0.01 10*3/mm3      nRBC 0.0 /100 WBC     Urinalysis With Microscopic If Indicated (No Culture) - Urine, Clean Catch [[id number]]  (Abnormal) Collected: 11/21/21 1347    Specimen: Urine, Clean Catch Updated: 11/21/21 1438     Color, UA Yellow     Appearance, UA Clear     pH, UA 7.5     Specific Gravity, UA 1.010     Glucose, UA Negative     Ketones, UA Negative     Bilirubin, UA Negative     Blood, UA Negative     Protein, UA Negative     Leuk Esterase, UA Trace     Nitrite, UA Negative     Urobilinogen, UA 0.2 E.U./dL    Urinalysis, Microscopic Only - Urine, Clean Catch [[id number]]  (Abnormal) Collected: 11/21/21 1347    Specimen: Urine, Clean Catch Updated: 11/21/21 1438     RBC, UA 0-2 /HPF      WBC, UA 0-2 /HPF      Bacteria, UA None Seen /HPF      Squamous Epithelial Cells, UA 0-2 /HPF      Hyaline Casts, UA 0-2 /LPF      Methodology Automated Microscopy           Imaging:  CT Abdomen Pelvis With Contrast    Result Date: 11/21/2021  PROCEDURE: CT ABDOMEN PELVIS W CONTRAST  COMPARISON: [facility], CT, ABDOMEN/PELVIS WITH CONTRAST,  4/28/2016, 15:36.  INDICATIONS: Abdominal pain  TECHNIQUE: After obtaining the patient's consent, CT images were created with non-ionic intravenous contrast material.   PROTOCOL:   Standard imaging protocol performed    RADIATION:   DLP: 589.9 mGy*cm   Automated exposure control was utilized to minimize radiation dose. CONTRAST: 100 cc Isovue 370 I.V. LABS:   eGFR: >60 ml/min/1.73m2  FINDINGS:  Abdomen:  Small left pleural effusion.  Liver, spleen, kidneys, adrenal glands, pancreas unremarkable.  Previous cholecystectomy.  Uncomplicated sigmoid diverticulosis.  Moderate colonic stool burden.  Bowel loops are nondilated.  Pelvis:  No pelvic mass or fluid.  No aggressive appearing bone change  CONCLUSION: No acute finding in the abdomen or pelvis.  Small left pleural effusion.     DELORIS FERGUSON MD       Electronically Signed and Approved By: DELORIS FERGUSON MD on 11/21/2021 at 17:02               Procedures:  Procedures    Progress                      The patient was seen evaluated ED by me.  The above history and physical examination was performed as document.  The diagnostic was obtained peer results reviewed.  Findings were discussed with the patient and family.  At this point time there is no to any acute emergency medical condition.  Patient is scheduled for outpatient endoscopy the first part of December.  This is in part due to what she is even here for today.  I explained to the patient that she needs to continue with this treatment plan.  In the interim I will give her a few pain pills to take sparingly to help with her symptoms until she has a scope done.  Patient and family is aware the prescription plan agreeable to this.      Medical Decision Making:  MDM     Final diagnoses:   Abdominal pain, acute, epigastric        Disposition:  ED Disposition     ED Disposition Condition Comment    Discharge Stable           Documentation assistance provided by Rohini Taylor acting as scribe for Jordy Hoff DO.  Information recorded by the scribe was done at my direction and has been verified and validated by me.          Rohini Taylor  11/21/21 1339       Paul Wilson MD  11/21/21 1725       Jordy Hoff DO  11/22/21 0639

## 2021-11-24 ENCOUNTER — TELEPHONE (OUTPATIENT)
Dept: GASTROENTEROLOGY | Facility: CLINIC | Age: 71
End: 2021-11-24

## 2021-11-24 NOTE — TELEPHONE ENCOUNTER
We are unable to see if the diverticulitis has appropriately healed without the colonoscopy.  I understand if you wish to cancel the colonoscopy as it is your choice however we do have a new pill prep that we can send in instead of the liquid drink in order to prep if that is more enticing to you.

## 2021-11-24 NOTE — TELEPHONE ENCOUNTER
Ms. Mims states that she wants to cancel the colonoscopy but still do the EGD on 12/9/21. She states that she cannot do all the prep. I wanted to make you aware before I cancel it.

## 2021-11-29 NOTE — TELEPHONE ENCOUNTER
Patient still wishes to cancel colonoscopy and states that she still can not get all of those pills and water down.

## 2021-11-30 NOTE — TELEPHONE ENCOUNTER
Please cancel colonoscopy as that is what patient wishes.  Schedule follow-up if she would like to further discuss.

## 2021-12-02 ENCOUNTER — TELEPHONE (OUTPATIENT)
Dept: GASTROENTEROLOGY | Facility: CLINIC | Age: 71
End: 2021-12-02

## 2021-12-03 ENCOUNTER — LAB (OUTPATIENT)
Dept: LAB | Facility: HOSPITAL | Age: 71
End: 2021-12-03

## 2021-12-03 DIAGNOSIS — R10.10 UPPER ABDOMINAL PAIN: ICD-10-CM

## 2021-12-03 DIAGNOSIS — R14.0 ABDOMINAL BLOATING: ICD-10-CM

## 2021-12-03 DIAGNOSIS — Z87.19 HISTORY OF DIVERTICULITIS: ICD-10-CM

## 2021-12-03 PROCEDURE — C9803 HOPD COVID-19 SPEC COLLECT: HCPCS

## 2021-12-03 PROCEDURE — 87635 SARS-COV-2 COVID-19 AMP PRB: CPT

## 2021-12-04 LAB — SARS-COV-2 N GENE RESP QL NAA+PROBE: NOT DETECTED

## 2021-12-08 NOTE — PRE-PROCEDURE INSTRUCTIONS
Pt. Instructed on NPO after midnight, pre-op meds. Ok to take all meds except Aspirin, Vitamin D,Lisinopril, Carafate a.m. of procedure.       Covid test 12/3/21

## 2021-12-09 ENCOUNTER — ANESTHESIA EVENT (OUTPATIENT)
Dept: GASTROENTEROLOGY | Facility: HOSPITAL | Age: 71
End: 2021-12-09

## 2021-12-09 ENCOUNTER — HOSPITAL ENCOUNTER (OUTPATIENT)
Facility: HOSPITAL | Age: 71
Setting detail: SURGERY ADMIT
Discharge: HOME | End: 2021-12-09
Attending: INTERNAL MEDICINE | Admitting: INTERNAL MEDICINE

## 2021-12-09 ENCOUNTER — ANESTHESIA (OUTPATIENT)
Dept: GASTROENTEROLOGY | Facility: HOSPITAL | Age: 71
End: 2021-12-09

## 2021-12-09 VITALS
DIASTOLIC BLOOD PRESSURE: 64 MMHG | SYSTOLIC BLOOD PRESSURE: 125 MMHG | WEIGHT: 154.76 LBS | TEMPERATURE: 97.9 F | OXYGEN SATURATION: 96 % | HEART RATE: 84 BPM | BODY MASS INDEX: 26.57 KG/M2 | RESPIRATION RATE: 13 BRPM

## 2021-12-09 DIAGNOSIS — R10.10 UPPER ABDOMINAL PAIN: ICD-10-CM

## 2021-12-09 DIAGNOSIS — Z87.19 HISTORY OF DIVERTICULITIS: ICD-10-CM

## 2021-12-09 DIAGNOSIS — R14.0 ABDOMINAL BLOATING: ICD-10-CM

## 2021-12-09 DIAGNOSIS — R11.0 NAUSEA: ICD-10-CM

## 2021-12-09 PROCEDURE — 25010000002 PROPOFOL 10 MG/ML EMULSION: Performed by: NURSE ANESTHETIST, CERTIFIED REGISTERED

## 2021-12-09 PROCEDURE — 43239 EGD BIOPSY SINGLE/MULTIPLE: CPT | Performed by: INTERNAL MEDICINE

## 2021-12-09 PROCEDURE — 88305 TISSUE EXAM BY PATHOLOGIST: CPT | Performed by: INTERNAL MEDICINE

## 2021-12-09 RX ORDER — SODIUM CHLORIDE, SODIUM LACTATE, POTASSIUM CHLORIDE, CALCIUM CHLORIDE 600; 310; 30; 20 MG/100ML; MG/100ML; MG/100ML; MG/100ML
30 INJECTION, SOLUTION INTRAVENOUS CONTINUOUS
Status: DISCONTINUED | OUTPATIENT
Start: 2021-12-09 | End: 2021-12-09 | Stop reason: HOSPADM

## 2021-12-09 RX ORDER — LIDOCAINE HYDROCHLORIDE 20 MG/ML
INJECTION, SOLUTION INFILTRATION; PERINEURAL AS NEEDED
Status: DISCONTINUED | OUTPATIENT
Start: 2021-12-09 | End: 2021-12-09 | Stop reason: SURG

## 2021-12-09 RX ORDER — PROPOFOL 10 MG/ML
VIAL (ML) INTRAVENOUS AS NEEDED
Status: DISCONTINUED | OUTPATIENT
Start: 2021-12-09 | End: 2021-12-09 | Stop reason: SURG

## 2021-12-09 RX ADMIN — PROPOFOL 70 MG: 10 INJECTION, EMULSION INTRAVENOUS at 15:35

## 2021-12-09 RX ADMIN — LIDOCAINE HYDROCHLORIDE 60 MG: 20 INJECTION, SOLUTION INFILTRATION; PERINEURAL at 15:35

## 2021-12-09 RX ADMIN — SODIUM CHLORIDE, POTASSIUM CHLORIDE, SODIUM LACTATE AND CALCIUM CHLORIDE 30 ML/HR: 600; 310; 30; 20 INJECTION, SOLUTION INTRAVENOUS at 15:20

## 2021-12-09 RX ADMIN — PROPOFOL 175 MCG/KG/MIN: 10 INJECTION, EMULSION INTRAVENOUS at 15:35

## 2021-12-09 NOTE — ANESTHESIA PREPROCEDURE EVALUATION
Anesthesia Evaluation     Patient summary reviewed and Nursing notes reviewed   history of anesthetic complications: PONV  NPO Solid Status: > 8 hours  NPO Liquid Status: > 2 hours           Airway   Mallampati: II  TM distance: >3 FB  Neck ROM: full  No difficulty expected  Dental - normal exam     Pulmonary - normal exam   (+) a smoker Former,   Cardiovascular - normal exam  Exercise tolerance: good (4-7 METS)    (+) hypertension,       Neuro/Psych  (+) psychiatric history Anxiety and Depression,     GI/Hepatic/Renal/Endo    (+)  GERD,      Musculoskeletal (-) negative ROS    Abdominal  - normal exam   Substance History - negative use     OB/GYN negative ob/gyn ROS         Other - negative ROS                       Anesthesia Plan    ASA 2     general   (Total IV Anesthesia    Patient understands anesthesia not responsible for dental damage.  )  intravenous induction     Anesthetic plan, all risks, benefits, and alternatives have been provided, discussed and informed consent has been obtained with: patient.    Plan discussed with CRNA.

## 2021-12-09 NOTE — H&P
Pre Procedure History & Physical    Chief Complaint:   Epigastric pain, nausea, bloating, heartburn    Subjective     HPI:   72 yo F here for eval of epigastric pain, nausea, bloating, heartburn.    Past Medical History:   Past Medical History:   Diagnosis Date   • Abdominal pain    • Allergies    • Anxiety and depression    • GERD (gastroesophageal reflux disease)    • Hypertension    • Nausea    • PONV (postoperative nausea and vomiting)    • UTI (urinary tract infection)        Past Surgical History:  Past Surgical History:   Procedure Laterality Date   • CHOLECYSTECTOMY  1999   • ESOPHAGUS SURGERY     • HERNIA REPAIR  2015   • HYSTERECTOMY  1975   • MASTECTOMY  1995, 2011    bilat.       Family History:  Family History   Problem Relation Age of Onset   • Malig Hyperthermia Neg Hx        Social History:   reports that she has quit smoking. She has never used smokeless tobacco. She reports that she does not drink alcohol and does not use drugs.    Medications:   Medications Prior to Admission   Medication Sig Dispense Refill Last Dose   • amLODIPine (NORVASC) 5 MG tablet amlodipine 5 mg oral tablet take 1 tablet (5 mg) by oral route once daily   Active   12/9/2021 at 0900   • aspirin 81 MG chewable tablet aspirin 81 mg oral tablet,chewable chew 1 tablet (81 mg) by oral route once daily   Active   Past Week at Unknown time   • Cholecalciferol 25 MCG (1000 UT) capsule Vitamin D3 1,000 unit oral capsule take 1 capsule by oral route daily   Active   Past Week at Unknown time   • citalopram (CeleXA) 20 MG tablet citalopram 20 mg oral tablet take 1 tablet (20 mg) by oral route once daily   Active   12/8/2021 at Unknown time   • cyclobenzaprine (FLEXERIL) 5 MG tablet Take 5 mg by mouth 2 (Two) Times a Day As Needed.   12/8/2021 at Unknown time   • esomeprazole (nexIUM) 40 MG capsule Nexium 40 mg oral capsule,delayed release(DR/EC) take 1 capsule (40 mg) by oral route once daily   Suspended   12/8/2021 at Unknown time   •  famotidine (PEPCID) 40 MG tablet Take 40 mg by mouth 2 (Two) Times a Day.   12/8/2021 at Unknown time   • fluticasone (Flonase) 50 MCG/ACT nasal spray Flonase 50 mcg/actuation nasal spray,suspension inhale 1 spray (50 mcg) in each nostril by intranasal route once daily   Active   12/8/2021 at Unknown time   • hydrOXYzine (ATARAX) 25 MG tablet Take 25 mg by mouth 3 (Three) Times a Day As Needed.   12/8/2021 at Unknown time   • lisinopril (PRINIVIL,ZESTRIL) 40 MG tablet lisinopril 40 mg oral tablet take 1 tablet (40 mg) by oral route once daily   Active   12/8/2021 at Unknown time   • promethazine (PHENERGAN) 12.5 MG tablet Take 1 tablet by mouth Every 6 (Six) Hours As Needed for Nausea or Vomiting. 15 tablet 0 12/8/2021 at Unknown time   • sucralfate (Carafate) 1 g tablet Carafate 1 gram oral tablet take 1 tablet (1 gram) by oral route 4 times per day on an empty stomach 1 hour before meals and at bedtime   Suspended   12/8/2021 at Unknown time   • SudoGest 30 MG tablet TAKE ONE TABLET BY MOUTH EVERY 4 TO 6 HOURS FOR sinus pressure (monitor blood pressure)   Past Week at Unknown time   • traZODone (DESYREL) 50 MG tablet Take 50 mg by mouth every night at bedtime.   Past Week at Unknown time   • HYDROcodone-acetaminophen (NORCO) 5-325 MG per tablet Take 1 tablet by mouth Every 6 (Six) Hours As Needed for Moderate Pain . 20 tablet 0 Unknown at Unknown time   • Sodium Sulfate-Mag Sulfate-KCl (Sutab) [id number] MG tablet Take 12 tablets by mouth Take As Directed. Take 12 tablets at 6 pm and 12 tablets 4 hours prior to procedure. 24 tablet 0 Unknown at Unknown time       Allergies:  Sulfamethoxazole-trimethoprim    ROS:    Pertinent items are noted in HPI     Objective     Blood pressure 145/71, pulse 74, temperature 98.4 °F (36.9 °C), temperature source Temporal, resp. rate 18, weight 70.2 kg (154 lb 12.2 oz), SpO2 95 %.    Physical Exam   Constitutional: Pt is oriented to person, place, and time and well-developed,  well-nourished, and in no distress.   Mouth/Throat: Oropharynx is clear and moist.   Neck: Normal range of motion.   Cardiovascular: Normal rate, regular rhythm and normal heart sounds.    Pulmonary/Chest: Effort normal and breath sounds normal.   Abdominal: Soft. Nontender  Skin: Skin is warm and dry.   Psychiatric: Mood, memory, affect and judgment normal.     Assessment/Plan     Diagnosis:  Epigastric pain, nausea, bloating, heartburn    Anticipated Surgical Procedure:  EGD    The risks, benefits, and alternatives of this procedure have been discussed with the patient or the responsible party- the patient understands and agrees to proceed.

## 2021-12-09 NOTE — ANESTHESIA POSTPROCEDURE EVALUATION
Patient: Carlene Mims    Procedure Summary     Date: 12/09/21 Room / Location: [facility] 4 / [facility]    Anesthesia Start: 1533 Anesthesia Stop: 1548    Procedure: ESOPHAGOGASTRODUODENOSCOPY WITH BIOPSIES (N/A ) Diagnosis:       Abdominal bloating      Upper abdominal pain      History of diverticulitis      (Abdominal bloating [R14.0])      (Upper abdominal pain [R10.10])      (History of diverticulitis [Z87.19])    Surgeons: Beba Warren MD Provider: Karen Rodriguez DO    Anesthesia Type: general ASA Status: 2          Anesthesia Type: general    Vitals  No vitals data found for the desired time range.          Post Anesthesia Care and Evaluation    Patient location during evaluation: bedside  Patient participation: complete - patient participated  Level of consciousness: awake  Pain management: adequate  Airway patency: patent  Anesthetic complications: No anesthetic complications  PONV Status: none  Cardiovascular status: acceptable and stable  Respiratory status: acceptable  Hydration status: acceptable    Comments: An Anesthesiologist personally participated in the most demanding procedures (including induction and emergence if applicable) in the anesthesia plan, monitored the course of anesthesia administration at frequent intervals and remained physically present and available for immediate diagnosis and treatment of emergencies.

## 2021-12-13 LAB
CYTO UR: NORMAL
LAB AP CASE REPORT: NORMAL
LAB AP CLINICAL INFORMATION: NORMAL
PATH REPORT.FINAL DX SPEC: NORMAL
PATH REPORT.GROSS SPEC: NORMAL

## 2021-12-21 ENCOUNTER — TELEPHONE (OUTPATIENT)
Dept: GASTROENTEROLOGY | Facility: CLINIC | Age: 71
End: 2021-12-21

## 2021-12-21 NOTE — TELEPHONE ENCOUNTER
----- Message from JOSIAH Lance sent at 12/17/2021  8:45 PM EST -----  Stomach bx consistent with gastritis. Please continue PPI and avoids NSAIDS. Please make sure pt has a f/u appt scheduled.

## 2021-12-21 NOTE — TELEPHONE ENCOUNTER
Spoke to pt and informed of Long [facility] note.  Pt requested a f/u to be scheduled soon r/t abd pain worsening. Scheduled for 12/22/2021 @ 1000. Pt verbalized understanding.

## 2021-12-22 ENCOUNTER — OFFICE VISIT (OUTPATIENT)
Dept: GASTROENTEROLOGY | Facility: CLINIC | Age: 71
End: 2021-12-22

## 2021-12-22 ENCOUNTER — TELEPHONE (OUTPATIENT)
Dept: GASTROENTEROLOGY | Facility: CLINIC | Age: 71
End: 2021-12-22

## 2021-12-22 VITALS
WEIGHT: 155.42 LBS | HEART RATE: 87 BPM | BODY MASS INDEX: 26.53 KG/M2 | DIASTOLIC BLOOD PRESSURE: 60 MMHG | HEIGHT: 64 IN | SYSTOLIC BLOOD PRESSURE: 122 MMHG

## 2021-12-22 DIAGNOSIS — R10.13 EPIGASTRIC PAIN: ICD-10-CM

## 2021-12-22 DIAGNOSIS — R11.0 NAUSEA: ICD-10-CM

## 2021-12-22 DIAGNOSIS — K91.89 SLIPPED NISSEN FUNDOPLICATION: ICD-10-CM

## 2021-12-22 DIAGNOSIS — R07.89 CHEST DISCOMFORT: ICD-10-CM

## 2021-12-22 DIAGNOSIS — R12 HEARTBURN: ICD-10-CM

## 2021-12-22 DIAGNOSIS — Z98.890 HISTORY OF NISSEN FUNDOPLICATION: ICD-10-CM

## 2021-12-22 DIAGNOSIS — K29.50 CHRONIC GASTRITIS WITHOUT BLEEDING, UNSPECIFIED GASTRITIS TYPE: Primary | ICD-10-CM

## 2021-12-22 PROCEDURE — 99213 OFFICE O/P EST LOW 20 MIN: CPT | Performed by: NURSE PRACTITIONER

## 2021-12-22 RX ORDER — PROMETHAZINE HYDROCHLORIDE 25 MG/1
25 TABLET ORAL EVERY 6 HOURS PRN
COMMUNITY
Start: 2021-11-23 | End: 2022-03-09

## 2021-12-22 RX ORDER — ENALAPRIL MALEATE 10 MG/1
TABLET ORAL
COMMUNITY
End: 2021-12-22

## 2021-12-22 RX ORDER — NITROFURANTOIN 25; 75 MG/1; MG/1
100 CAPSULE ORAL 2 TIMES DAILY
COMMUNITY
Start: 2021-11-12 | End: 2022-03-09

## 2021-12-22 RX ORDER — PEPPERMINT OIL 90 MG
25 CAPSULE, DELAYED, AND EXTENDED RELEASE ORAL 2 TIMES DAILY
Qty: 20 CAPSULE | Refills: 0 | COMMUNITY
Start: 2021-12-22

## 2021-12-22 RX ORDER — DICYCLOMINE HYDROCHLORIDE 10 MG/1
CAPSULE ORAL
COMMUNITY
Start: 2021-11-23 | End: 2022-03-09

## 2021-12-22 NOTE — PROGRESS NOTES
"  Chief Complaint  Follow-up (EGD and Colonoscopy ), Abdominal Pain (Has gotten worse, Reflux and chest pain ), and Nausea (Every morning )    History of Present Illness  Carlene Mims is a 71 y.o. who presents to [facility]STR[facility]OLOGY for follow up of Follow-up (EGD and Colonoscopy ), Abdominal Pain (Has gotten worse, Reflux and chest pain ), and Nausea (Every morning )     Ms. Mims presents today for follow-up EGD.  Biopsies were consistent with gastritis.  Nissen was noted however appeared to be loose.  Patient reports she is continuing to have heartburn despite taking  nexium 40mg, pepcid 40mg, and carafate. She actually feels that heartburn is getting worse. Having to force herself to eat as the reflux is worse after meals.  Persistent nausea without vomiting. Increased bleching that she descrbies as \"never ending\".  Expresses she was eating a hamburger last night and felt that she was eating choked and that has never happened before.       Labs Result Review Imaging    Past Medical History:   Diagnosis Date   • Abdominal pain    • Allergies    • Anxiety and depression    • GERD (gastroesophageal reflux disease)    • Hypertension    • Nausea    • PONV (postoperative nausea and vomiting)    • UTI (urinary tract infection)        Past Surgical History:   Procedure Laterality Date   • CHOLECYSTECTOMY  1999   • ENDOSCOPY N/A 12/9/2021    Procedure: ESOPHAGOGASTRODUODENOSCOPY WITH BIOPSIES;  Surgeon: Beba Warren MD;  Location: [facility] ENDOSCOPY;  Service: Gastroenterology;  Laterality: N/A;  GASTRITIS   • ESOPHAGUS SURGERY     • HERNIA REPAIR  2015   • HYSTERECTOMY  1975   • MASTECTOMY  1995, 2011    bilat.       Current Outpatient Medications on File Prior to Visit   Medication Sig Dispense Refill   • amLODIPine (NORVASC) 5 MG tablet amlodipine 5 mg oral tablet take 1 tablet (5 mg) by oral route once daily   Active     • aspirin 81 MG chewable tablet aspirin 81 mg oral " tablet,chewable chew 1 tablet (81 mg) by oral route once daily   Active     • Cholecalciferol 25 MCG (1000 UT) capsule Vitamin D3 1,000 unit oral capsule take 1 capsule by oral route daily   Active     • citalopram (CeleXA) 20 MG tablet citalopram 20 mg oral tablet take 1 tablet (20 mg) by oral route once daily   Active     • cyclobenzaprine (FLEXERIL) 5 MG tablet Take 5 mg by mouth 2 (Two) Times a Day As Needed.     • dicyclomine (BENTYL) 10 MG capsule TAKE ONE CAPSULE BY MOUTH FOUR TIMES DAILY AS NEEDED     • esomeprazole (nexIUM) 40 MG capsule Nexium 40 mg oral capsule,delayed release(DR/EC) take 1 capsule (40 mg) by oral route once daily   Suspended     • famotidine (PEPCID) 40 MG tablet Take 40 mg by mouth 2 (Two) Times a Day.     • fluticasone (Flonase) 50 MCG/ACT nasal spray Flonase 50 mcg/actuation nasal spray,suspension inhale 1 spray (50 mcg) in each nostril by intranasal route once daily   Active     • hydrOXYzine (ATARAX) 25 MG tablet Take 25 mg by mouth 3 (Three) Times a Day As Needed.     • lisinopril (PRINIVIL,ZESTRIL) 40 MG tablet lisinopril 40 mg oral tablet take 1 tablet (40 mg) by oral route once daily   Active     • nitrofurantoin, macrocrystal-monohydrate, (MACROBID) 100 MG capsule Take 100 mg by mouth 2 (Two) Times a Day.     • promethazine (PHENERGAN) 25 MG tablet Take 25 mg by mouth Every 6 (Six) Hours As Needed. for nausea     • sucralfate (Carafate) 1 g tablet Carafate 1 gram oral tablet take 1 tablet (1 gram) by oral route 4 times per day on an empty stomach 1 hour before meals and at bedtime   Suspended     • SudoGest 30 MG tablet TAKE ONE TABLET BY MOUTH EVERY 4 TO 6 HOURS FOR sinus pressure (monitor blood pressure)     • traZODone (DESYREL) 50 MG tablet Take 50 mg by mouth every night at bedtime.     • [DISCONTINUED] enalapril (VASOTEC) 10 MG tablet enalapril maleate 10 mg oral tablet take 1 tablet (10 mg) by oral route once daily   Suspended     • [DISCONTINUED]  "HYDROcodone-acetaminophen (NORCO) 5-325 MG per tablet Take 1 tablet by mouth Every 6 (Six) Hours As Needed for Moderate Pain . 20 tablet 0   • [DISCONTINUED] promethazine (PHENERGAN) 12.5 MG tablet Take 1 tablet by mouth Every 6 (Six) Hours As Needed for Nausea or Vomiting. 15 tablet 0   • [DISCONTINUED] Sodium Sulfate-Mag Sulfate-KCl (Sutab) 6160-875-055 MG tablet Take 12 tablets by mouth Take As Directed. Take 12 tablets at 6 pm and 12 tablets 4 hours prior to procedure. 24 tablet 0     No current facility-administered medications on file prior to visit.       Social History     Social History Narrative   • Not on file         Objective     Review of Systems   Constitutional: Positive for appetite change.   Gastrointestinal: Positive for abdominal pain, nausea and GERD.        Vital Signs:   /60 (BP Location: Right leg, Patient Position: Sitting, Cuff Size: Large Adult)   Pulse 87   Ht 162.6 cm (64\")   Wt 70.5 kg (155 lb 6.8 oz)   BMI 26.68 kg/m²       Physical Exam  Constitutional:       General: She is not in acute distress.     Appearance: Normal appearance. She is well-developed and normal weight.   HENT:      Head: Normocephalic and atraumatic.   Eyes:      Conjunctiva/sclera: Conjunctivae normal.      Pupils: Pupils are equal, round, and reactive to light.      Visual Fields: Right eye visual fields normal and left eye visual fields normal.   Cardiovascular:      Rate and Rhythm: Normal rate and regular rhythm.      Heart sounds: Normal heart sounds.   Pulmonary:      Effort: Pulmonary effort is normal. No retractions.      Breath sounds: Normal breath sounds and air entry.   Abdominal:      General: Bowel sounds are normal.      Palpations: Abdomen is soft.      Tenderness: There is abdominal tenderness in the epigastric area.      Comments: No appreciable hepatosplenomegaly or ascites   Musculoskeletal:         General: Normal range of motion.      Cervical back: Neck supple.      Right lower " leg: No edema.      Left lower leg: No edema.   Lymphadenopathy:      Cervical: No cervical adenopathy.   Skin:     General: Skin is warm and dry.      Findings: No lesion.   Neurological:      General: No focal deficit present.      Mental Status: She is alert and oriented to person, place, and time.   Psychiatric:         Mood and Affect: Mood and affect normal.         Behavior: Behavior normal.         Result Review :   The following data was reviewed by: JOSIAH Lance on 12/22/2021:  CMP    CMP 11/4/21 11/21/21   Glucose 94 101 (A)   BUN 21 15   Creatinine 1.35 (A) 1.12 (A)   eGFR Non  Am 39 (A) 48 (A)   Sodium 138 140   Potassium 4.4 4.4   Chloride 103 103   Calcium 10.5 10.2   Albumin 4.70 4.50   Total Bilirubin 0.2 0.3   Alkaline Phosphatase 156 (A) 174 (A)   AST (SGOT) 21 15   ALT (SGPT) 16 10   (A) Abnormal value            CBC w/diff    CBC w/Diff 11/21/21   WBC 7.77   RBC 4.20   Hemoglobin 12.5   Hematocrit 38.3   MCV 91.2   MCH 29.8   MCHC 32.6   RDW 13.1   Platelets 414   Neutrophil Rel % 65.9   Immature Granulocyte Rel % 0.1   Lymphocyte Rel % 22.4   Monocyte Rel % 7.7   Eosinophil Rel % 3.1   Basophil Rel % 0.8           No results found for: IRON, TIBC, FERRITIN, LABIRON      EGD 12/9/2021: Erythematous mucosa in the antrum.  A Niesen fundoplication was found.  The wrap appears loose.  Antrum biopsy-reactive gastropathy.     Assessment and Plan    Diagnoses and all orders for this visit:    1. Chronic gastritis without bleeding, unspecified gastritis type (Primary)    2. History of Nissen fundoplication    3. Heartburn    4. Nausea    5. Epigastric pain    6. Chest discomfort      * Surgery not found *     Discussed with patient that symptoms could be related to the Nissen appearing to be loose on EGD.  Nissen was done by Dr. Lowery in [facility] however patient unsure if she would like to travel to [facility].  Offered to refer her to Dr. Hoffman however she states she would like to discuss  this over with her  first.  Samples of FD Tip given.    Also offered to switch her from Nexium to AcipHex however she states her insurance is not covering PPIs anyways.    Patient to call office if she has another episode noted of dysphagia and we will order an esophagram.    Follow Up   Return if symptoms worsen or fail to improve.  Patient was given instructions and counseling regarding her condition or for health maintenance advice. Please see specific information pulled into the AVS if appropriate.

## 2021-12-22 NOTE — TELEPHONE ENCOUNTER
Patient called and reported that she would like to be referred to Dr. Lepe. Patient was scheduled with Dr. Lepe on 1/6/2022 at 10am and patient verbalized understanding.

## 2021-12-22 NOTE — PATIENT INSTRUCTIONS

## 2022-03-09 ENCOUNTER — OFFICE VISIT (OUTPATIENT)
Dept: GASTROENTEROLOGY | Facility: CLINIC | Age: 72
End: 2022-03-09

## 2022-03-09 VITALS
WEIGHT: 163.4 LBS | BODY MASS INDEX: 27.9 KG/M2 | SYSTOLIC BLOOD PRESSURE: 146 MMHG | DIASTOLIC BLOOD PRESSURE: 66 MMHG | HEIGHT: 64 IN | HEART RATE: 84 BPM

## 2022-03-09 DIAGNOSIS — R13.13 PHARYNGEAL DYSPHAGIA: ICD-10-CM

## 2022-03-09 DIAGNOSIS — R14.0 ABDOMINAL BLOATING: Primary | ICD-10-CM

## 2022-03-09 DIAGNOSIS — K31.84 GASTROPARESIS: ICD-10-CM

## 2022-03-09 DIAGNOSIS — K59.04 CHRONIC IDIOPATHIC CONSTIPATION: ICD-10-CM

## 2022-03-09 PROCEDURE — 99214 OFFICE O/P EST MOD 30 MIN: CPT | Performed by: NURSE PRACTITIONER

## 2022-03-09 RX ORDER — LISINOPRIL 40 MG/1
40 TABLET ORAL DAILY
COMMUNITY
End: 2022-03-09

## 2022-03-09 RX ORDER — METOCLOPRAMIDE 10 MG/1
10 TABLET ORAL
COMMUNITY
Start: 2022-02-10 | End: 2022-03-09 | Stop reason: SDUPTHER

## 2022-03-09 RX ORDER — MELATONIN
1000 DAILY
COMMUNITY

## 2022-03-09 RX ORDER — METOCLOPRAMIDE 10 MG/1
10 TABLET ORAL
Qty: 90 TABLET | Refills: 0 | Status: SHIPPED | OUTPATIENT
Start: 2022-03-09

## 2022-03-09 RX ORDER — ASPIRIN 81 MG/1
81 TABLET ORAL DAILY
COMMUNITY

## 2022-03-09 RX ORDER — PRUCALOPRIDE 2 MG/1
2 TABLET, FILM COATED ORAL DAILY
Qty: 30 TABLET | Refills: 3 | Status: SHIPPED | OUTPATIENT
Start: 2022-03-09 | End: 2022-03-14 | Stop reason: CLARIF

## 2022-03-09 RX ORDER — HYDROXYZINE HYDROCHLORIDE 10 MG/1
TABLET, FILM COATED ORAL
COMMUNITY

## 2022-03-09 RX ORDER — PRUCALOPRIDE 2 MG/1
2 TABLET, FILM COATED ORAL DAILY
Qty: 7 TABLET | Refills: 0 | COMMUNITY
Start: 2022-03-09 | End: 2022-03-14 | Stop reason: CLARIF

## 2022-03-09 RX ORDER — BACILLUS COAGULANS/INULIN 1B-250 MG
CAPSULE ORAL
COMMUNITY

## 2022-03-09 RX ORDER — FLUTICASONE PROPIONATE 50 MCG
1 SPRAY, SUSPENSION (ML) NASAL DAILY
COMMUNITY
End: 2022-03-09

## 2022-03-09 NOTE — PROGRESS NOTES
"  Chief Complaint  Difficulty Swallowing and GI Problem (Bloating/gas)    History of Present Illness  Carlene Mims is a 71 y.o. who presents to [facility] GASTROENTEROLOGY for follow up of Difficulty Swallowing and GI Problem (Bloating/gas).    Ms. Mims reports pharyngeal dysphagia with solids and liquids with all meals now.  Reports food is \"hanging\" in her esophagus and she has to drink in order to get food to pass. This then causes her to have pain radiate into her neck and ears. When she bends over she reports a sensation that something is pushing up in her throat. Occasional heartburn despite protonix and pepcid. Reglan has helped decrease breakthrough HB. Occasional nasuea however not as often since Reglan as well. Denies vomiting.  Her biggest complaint is she continues to feel very bloated.  No change in bloating with FD guard.    Recently seen Dr. Lepe due to loose Nissen findings on EGD.  Patient reports he did not feel that her symptoms were stemming from those findings.  Gastric emptying study was performed and there was a mild delay in gastric emptying at our 2 however there was normal emptying at hour 4.  Patient was placed on Reglan with moderate control of symptoms.  Patient reports she understands blackbox warning however feels that that is her only option at this point.    Reports she has been struggling with constipation as well on and off for several years now.  Previously felt that MiraLAX helped however no longer seems to be working.  Abdominal cramping at times that makes her feel an urge to have a BM however cannot always go.  Can have a few bowel movements a week however has been over a week before without having a BM.  Denies any hematochezia or melena.    Labs Result Review Imaging    Past Medical History:   Diagnosis Date   • Abdominal pain    • Allergies    • Anxiety and depression    • GERD (gastroesophageal reflux disease)    • Hypertension    • Nausea    • " PONV (postoperative nausea and vomiting)    • UTI (urinary tract infection)        Past Surgical History:   Procedure Laterality Date   • CHOLECYSTECTOMY  1999   • ENDOSCOPY N/A 12/9/2021    Procedure: ESOPHAGOGASTRODUODENOSCOPY WITH BIOPSIES;  Surgeon: Beba Warren MD;  Location: [facility] ENDOSCOPY;  Service: Gastroenterology;  Laterality: N/A;  GASTRITIS   • ESOPHAGUS SURGERY     • HERNIA REPAIR  2015   • HYSTERECTOMY  1975   • MASTECTOMY  1995, 2011    bilat.       Current Outpatient Medications on File Prior to Visit   Medication Sig Dispense Refill   • amLODIPine (NORVASC) 5 MG tablet amlodipine 5 mg oral tablet take 1 tablet (5 mg) by oral route once daily   Active     • aspirin 81 MG EC tablet Take 81 mg by mouth Daily.     • Bacillus Coagulans-Inulin (Probiotic) 1-250 BILLION-MG capsule Take  by mouth.     • Chapo Oil-Levomenthol (FDgard) 25-20.75 MG capsule Take 25 mg by mouth 2 (Two) Times a Day. 20 capsule 0   • cholecalciferol (VITAMIN D3) 25 MCG (1000 UT) tablet Take 1,000 Units by mouth Daily.     • citalopram (CeleXA) 20 MG tablet citalopram 20 mg oral tablet take 1 tablet (20 mg) by oral route once daily   Active     • cyclobenzaprine (FLEXERIL) 5 MG tablet Take 5 mg by mouth 2 (Two) Times a Day As Needed.     • esomeprazole (nexIUM) 40 MG capsule Nexium 40 mg oral capsule,delayed release(DR/EC) take 1 capsule (40 mg) by oral route once daily   Suspended     • famotidine (PEPCID) 40 MG tablet Take 40 mg by mouth 2 (Two) Times a Day.     • fluticasone (FLONASE) 50 MCG/ACT nasal spray Flonase 50 mcg/actuation nasal spray,suspension inhale 1 spray (50 mcg) in each nostril by intranasal route once daily   Active     • hydrOXYzine (ATARAX) 10 MG tablet Take  by mouth.     • lisinopril (PRINIVIL,ZESTRIL) 40 MG tablet lisinopril 40 mg oral tablet take 1 tablet (40 mg) by oral route once daily   Active     • SudoGest 30 MG tablet TAKE ONE TABLET BY MOUTH EVERY 4 TO 6 HOURS FOR sinus pressure  "(monitor blood pressure)     • traZODone (DESYREL) 50 MG tablet Take 50 mg by mouth every night at bedtime.     • [DISCONTINUED] metoclopramide (REGLAN) 10 MG tablet Take 10 mg by mouth 3 (Three) Times a Day With Meals. NULL     • [DISCONTINUED] promethazine (PHENERGAN) 25 MG tablet Take 25 mg by mouth Every 6 (Six) Hours As Needed. for nausea     • [DISCONTINUED] aspirin 81 MG chewable tablet aspirin 81 mg oral tablet,chewable chew 1 tablet (81 mg) by oral route once daily   Active     • [DISCONTINUED] Cholecalciferol 25 MCG (1000 UT) capsule Vitamin D3 1,000 unit oral capsule take 1 capsule by oral route daily   Active     • [DISCONTINUED] dicyclomine (BENTYL) 10 MG capsule TAKE ONE CAPSULE BY MOUTH FOUR TIMES DAILY AS NEEDED     • [DISCONTINUED] fluticasone (FLONASE) 50 MCG/ACT nasal spray 1 spray into the nostril(s) as directed by provider Daily.     • [DISCONTINUED] hydrOXYzine (ATARAX) 25 MG tablet Take 25 mg by mouth 3 (Three) Times a Day As Needed.     • [DISCONTINUED] lisinopril (PRINIVIL,ZESTRIL) 40 MG tablet Take 40 mg by mouth Daily.     • [DISCONTINUED] nitrofurantoin, macrocrystal-monohydrate, (MACROBID) 100 MG capsule Take 100 mg by mouth 2 (Two) Times a Day.     • [DISCONTINUED] sucralfate (CARAFATE) 1 g tablet Carafate 1 gram oral tablet take 1 tablet (1 gram) by oral route 4 times per day on an empty stomach 1 hour before meals and at bedtime   Suspended       No current facility-administered medications on file prior to visit.       Social History     Social History Narrative   • Not on file         Objective     Review of Systems   Constitutional: Negative for appetite change and unexpected weight loss.   Gastrointestinal: Positive for abdominal distention, constipation and GERD.        Vital Signs:   /66 (BP Location: Right leg, Patient Position: Sitting, Cuff Size: Large Adult)   Pulse 84   Ht 162.6 cm (64\")   Wt 74.1 kg (163 lb 6.4 oz)   BMI 28.05 kg/m²       Physical " Exam  Constitutional:       General: She is not in acute distress.     Appearance: Normal appearance. She is well-developed and normal weight.   HENT:      Head: Normocephalic and atraumatic.   Eyes:      Conjunctiva/sclera: Conjunctivae normal.      Pupils: Pupils are equal, round, and reactive to light.      Visual Fields: Right eye visual fields normal and left eye visual fields normal.   Cardiovascular:      Rate and Rhythm: Normal rate and regular rhythm.      Heart sounds: Normal heart sounds.   Pulmonary:      Effort: Pulmonary effort is normal. No retractions.      Breath sounds: Normal breath sounds and air entry.   Abdominal:      General: Bowel sounds are normal. There is distension.      Palpations: Abdomen is soft.      Tenderness: There is no abdominal tenderness.      Comments: No appreciable hepatosplenomegaly or ascites   Musculoskeletal:         General: Normal range of motion.      Cervical back: Normal range of motion and neck supple.   Skin:     General: Skin is warm and dry.   Neurological:      Mental Status: She is alert and oriented to person, place, and time.   Psychiatric:         Mood and Affect: Mood and affect normal.         Behavior: Behavior normal.         Result Review :   The following data was reviewed by: JOSIAH Lance on 03/09/2022:  CBC w/diff    CBC w/Diff 11/21/21   WBC 7.77   RBC 4.20   Hemoglobin 12.5   Hematocrit 38.3   MCV 91.2   MCH 29.8   MCHC 32.6   RDW 13.1   Platelets 414   Neutrophil Rel % 65.9   Immature Granulocyte Rel % 0.1   Lymphocyte Rel % 22.4   Monocyte Rel % 7.7   Eosinophil Rel % 3.1   Basophil Rel % 0.8           CMP    CMP 11/4/21 11/21/21   Glucose 94 101 (A)   BUN 21 15   Creatinine 1.35 (A) 1.12 (A)   eGFR Non  Am 39 (A) 48 (A)   Sodium 138 140   Potassium 4.4 4.4   Chloride 103 103   Calcium 10.5 10.2   Albumin 4.70 4.50   Total Bilirubin 0.2 0.3   Alkaline Phosphatase 156 (A) 174 (A)   AST (SGOT) 21 15   ALT (SGPT) 16 10   (A)  Abnormal value            Lipase   Date Value Ref Range Status   11/21/2021 23 13 - 60 U/L Final        EGD 12/9/2021: Erythematous mucosa in the antrum.  A Niesen fundoplication was found.  The wrap appears loose.  Antrum biopsy-reactive gastropathy.        Assessment and Plan    Diagnoses and all orders for this visit:    1. Abdominal bloating (Primary)  -     FL Video Swallow With Speech Single Contrast; Future  -     FL Esophagram Complete; Future    2. Gastroparesis    3. Pharyngeal dysphagia  -     FL Video Swallow With Speech Single Contrast; Future  -     FL Esophagram Complete; Future    4. Chronic idiopathic constipation    Other orders  -     metoclopramide (REGLAN) 10 MG tablet; Take 1 tablet by mouth 3 (Three) Times a Day With Meals. NULL  Dispense: 90 tablet; Refill: 0  -     Prucalopride Succinate (Motegrity) 2 MG tablet; Take 1 tablet by mouth Daily for 90 days.  Dispense: 30 tablet; Refill: 3  -     Prucalopride Succinate (Motegrity) 2 MG tablet; Take 1 tablet by mouth Daily.  Dispense: 7 tablet; Refill: 0      * Surgery not found *       Follow Up   Return in about 3 months (around 6/9/2022).  Patient was given instructions and counseling regarding her condition or for health maintenance advice. Please see specific information pulled into the AVS if appropriate.

## 2022-03-10 ENCOUNTER — HOSPITAL ENCOUNTER (OUTPATIENT)
Dept: GENERAL RADIOLOGY | Facility: HOSPITAL | Age: 72
Discharge: HOME | End: 2022-03-10
Admitting: NURSE PRACTITIONER

## 2022-03-10 ENCOUNTER — TELEPHONE (OUTPATIENT)
Dept: GASTROENTEROLOGY | Facility: CLINIC | Age: 72
End: 2022-03-10

## 2022-03-10 DIAGNOSIS — R13.13 PHARYNGEAL DYSPHAGIA: ICD-10-CM

## 2022-03-10 DIAGNOSIS — R14.0 ABDOMINAL BLOATING: ICD-10-CM

## 2022-03-10 PROCEDURE — A9270 NON-COVERED ITEM OR SERVICE: HCPCS | Performed by: NURSE PRACTITIONER

## 2022-03-10 PROCEDURE — 92611 MOTION FLUOROSCOPY/SWALLOW: CPT

## 2022-03-10 PROCEDURE — 63710000001 BARIUM SULFATE 60 % CREAM: Performed by: NURSE PRACTITIONER

## 2022-03-10 PROCEDURE — 74230 X-RAY XM SWLNG FUNCJ C+: CPT

## 2022-03-10 PROCEDURE — 63710000001 BARIUM SULFATE 98 % RECONSTITUTED SUSPENSION: Performed by: NURSE PRACTITIONER

## 2022-03-10 PROCEDURE — 63710000001 BARIUM SULFATE 40 % RECONSTITUTED SUSPENSION: Performed by: NURSE PRACTITIONER

## 2022-03-10 PROCEDURE — 63710000001 BARIUM SULFATE 105 % SUSPENSION: Performed by: NURSE PRACTITIONER

## 2022-03-10 PROCEDURE — 63710000001 SOD BICARB-CITRIC ACID-SIMETHICONE 2.21-1.53-0.04 G PACK: Performed by: NURSE PRACTITIONER

## 2022-03-10 PROCEDURE — 63710000001 BARIUM SULFATE 60 % SUSPENSION: Performed by: NURSE PRACTITIONER

## 2022-03-10 RX ADMIN — BARIUM SULFATE 1500 ML: 1.05 SUSPENSION ORAL; RECTAL at 08:43

## 2022-03-10 RX ADMIN — BARIUM SULFATE 55 ML: 0.81 POWDER, FOR SUSPENSION ORAL at 08:43

## 2022-03-10 RX ADMIN — BARIUM SULFATE 355 ML: 0.6 SUSPENSION ORAL at 08:43

## 2022-03-10 RX ADMIN — ANTACID/ANTIFLATULENT 1 PACKET: 380; 550; 10; 10 GRANULE, EFFERVESCENT ORAL at 08:43

## 2022-03-10 RX ADMIN — BARIUM SULFATE 135 ML: 980 POWDER, FOR SUSPENSION ORAL at 08:43

## 2022-03-10 RX ADMIN — BARIUM SULFATE 1 TEASPOON(S): 0.6 CREAM ORAL at 08:43

## 2022-03-10 NOTE — TELEPHONE ENCOUNTER
Patient states that chest xray was performed at [facility].   Xray has been requested from [facility].

## 2022-03-10 NOTE — MBS/VFSS/FEES
Outpatient - Speech Language Pathology   Swallow Modified Barium Swallow [facility]     Patient Name: Carlene Mims  : 1950  MRN: [id number]  Today's Date: 3/10/2022               Admit Date: 3/10/2022    Visit Dx:     ICD-10-CM ICD-9-CM   1. Abdominal bloating  R14.0 787.3   2. Pharyngeal dysphagia  R13.13 787.23     Patient Active Problem List   Diagnosis   • Abdominal bloating   • Upper abdominal pain   • History of diverticulitis     Past Medical History:   Diagnosis Date   • Abdominal pain    • Allergies    • Anxiety and depression    • GERD (gastroesophageal reflux disease)    • Hypertension    • Nausea    • PONV (postoperative nausea and vomiting)    • UTI (urinary tract infection)      Past Surgical History:   Procedure Laterality Date   • CHOLECYSTECTOMY     • ENDOSCOPY N/A 2021    Procedure: ESOPHAGOGASTRODUODENOSCOPY WITH BIOPSIES;  Surgeon: Beba Warren MD;  Location: [facility] ENDOSCOPY;  Service: Gastroenterology;  Laterality: N/A;  GASTRITIS   • ESOPHAGUS SURGERY     • HERNIA REPAIR     • HYSTERECTOMY     • MASTECTOMY  ,     bilat.   PERTINENT INFORMATION:  Patient is a 71 year old white female referred for modified barium swallow study due to report of globus sensation when swallowing.  Patient with history of Nissen procedure, gastritis and reflux..    Patient was referred for an MBSS by Dr. Flaquita Nieves to rule out aspiration as well as to determine appropriate treatment plan for this patient.      PROCEDURE:    Patient was alert and cooperative.  The patient was viewed in letter projection.  The following Ba consistencies were administered: Full range of consistencies with thin liquids from spoon cup and straw, purée consistencies soft and hard solids.  The following compensatory swallowing strategies were performed: N/A      RESULTS:    1.  Oral stage is characterized by good bolus preparation and control.  Timely oral transit.  Pharyngeal phase is  timely with good laryngeal elevation, base of tongue retraction and epiglottic retroflexion.  Shallow laryngeal penetration noted intermittently.  No aspiration.  Very mild vallecular residue after the swallow.  Refer to radiologist report for details of esophagram.      IMPRESSIONS:    Patient demonstrated functional oropharyngeal swallow negative for aspiration or pharyngeal residue.     FUNCTIONAL DEFICIT: Patient scored level 7 of 7 on Functional Communication Measures for swallowing indicating a 0% limitation in function for current status, goal status, and discharge status.      RECOMMENDATIONS:   1.  Regular diet-thin liquids  2.  90 degrees upright  3.  Strict reflux precautions      YES: patient/responsible party agrees with the plan of care and has been informed of all alternatives, risks and benefits.    Thank you for this referral.      EDUCATION  The patient has been educated in the following areas:   Oral Care/Hydration.       Kat Huffman, SLP  3/10/2022

## 2022-03-14 ENCOUNTER — TELEPHONE (OUTPATIENT)
Dept: GASTROENTEROLOGY | Facility: CLINIC | Age: 72
End: 2022-03-14

## 2022-04-08 DIAGNOSIS — K44.9 DIAPHRAGMATIC HERNIA WITHOUT OBSTRUCTION AND WITHOUT GANGRENE: Primary | ICD-10-CM

## 2022-04-11 ENCOUNTER — TELEPHONE (OUTPATIENT)
Dept: GASTROENTEROLOGY | Facility: CLINIC | Age: 72
End: 2022-04-11

## 2022-04-11 NOTE — TELEPHONE ENCOUNTER
Can you please get the information where the patient is having this done and what the procedure is called

## 2022-04-11 NOTE — TELEPHONE ENCOUNTER
Patient is having a revision of slip nissen. Patient is having it performed at [facility] on 04/13.

## 2022-04-11 NOTE — TELEPHONE ENCOUNTER
"Patient states that she had not heard from our office so she has been scheduled for a surgery on Wednesday to repair where the \"band had slipped\".   "

## 2022-04-11 NOTE — TELEPHONE ENCOUNTER
----- Message from JOSIAH Lance sent at 4/8/2022  1:57 PM EDT -----  Possible herniation noted on esophagram.  I would like to get a CT scan of abdomen to further evaluate location of possible stomach herniation.  We could consider a second opinion with Dr. Bimal Rosario as well given persistent trouble swallowing.  Please let me know if patient would like for me to place referral to Dr. Rosario.  Placing order for CT scan.
